# Patient Record
Sex: MALE | Race: WHITE | NOT HISPANIC OR LATINO | Employment: OTHER | URBAN - METROPOLITAN AREA
[De-identification: names, ages, dates, MRNs, and addresses within clinical notes are randomized per-mention and may not be internally consistent; named-entity substitution may affect disease eponyms.]

---

## 2020-03-14 PROBLEM — I63.9 CEREBROVASCULAR ACCIDENT (CVA): Status: ACTIVE | Noted: 2020-03-14

## 2020-03-14 PROBLEM — N17.9 AKI (ACUTE KIDNEY INJURY): Status: ACTIVE | Noted: 2020-03-14

## 2020-03-14 PROBLEM — Z71.89 ACP (ADVANCE CARE PLANNING): Status: ACTIVE | Noted: 2020-03-14

## 2020-03-15 PROBLEM — I63.511 ACUTE ISCHEMIC RIGHT MCA STROKE: Status: ACTIVE | Noted: 2020-03-14

## 2020-03-15 PROBLEM — G21.9 SECONDARY PARKINSONISM: Status: ACTIVE | Noted: 2020-03-15

## 2020-03-15 PROBLEM — R26.81 UNSTEADY GAIT: Status: ACTIVE | Noted: 2020-03-15

## 2020-03-17 PROBLEM — I10 PRIMARY HYPERTENSION: Status: ACTIVE | Noted: 2020-03-17

## 2020-03-17 PROBLEM — E78.5 DYSLIPIDEMIA: Status: ACTIVE | Noted: 2020-03-17

## 2020-03-17 PROBLEM — R53.1 LEFT-SIDED WEAKNESS: Status: ACTIVE | Noted: 2020-03-17

## 2020-03-25 ENCOUNTER — TELEPHONE (OUTPATIENT)
Dept: NEUROLOGY | Facility: CLINIC | Age: 80
End: 2020-03-25

## 2020-03-25 NOTE — TELEPHONE ENCOUNTER
----- Message from Jolanta Vasquez sent at 3/24/2020 10:49 AM CDT -----  Contact: Kelly  Type: Needs Medical Advice    Who Called:  Patient's daughter Kelly  Symptoms (please be specific):    How long has patient had these symptoms:    Pharmacy name and phone #:    Best Call Back Number:   Additional Information: requesting a call back to schedule hospital follow up,patient was seen by  in [facility]

## 2020-03-25 NOTE — TELEPHONE ENCOUNTER
Left message to call in regards to scheduling a hospital f/u.  Will call when the NP schedule is open.

## 2020-03-31 ENCOUNTER — TELEPHONE (OUTPATIENT)
Dept: NEUROLOGY | Facility: CLINIC | Age: 80
End: 2020-03-31

## 2020-04-03 ENCOUNTER — TELEPHONE (OUTPATIENT)
Dept: NEUROLOGY | Facility: CLINIC | Age: 80
End: 2020-04-03

## 2020-04-03 NOTE — TELEPHONE ENCOUNTER
----- Message from Kelli Julio sent at 4/3/2020 10:23 AM CDT -----  Type:  Patient Returning Call    Who Called:  Kelly (daughter)  Who Left Message for Patient:  Nika  Does the patient know what this is regarding?: schedule hospital f/u  Best Call Back Number:  [phone]  Additional Information:

## 2020-04-06 ENCOUNTER — TELEPHONE (OUTPATIENT)
Dept: NEUROLOGY | Facility: CLINIC | Age: 80
End: 2020-04-06

## 2020-04-06 ENCOUNTER — OFFICE VISIT (OUTPATIENT)
Dept: NEUROLOGY | Facility: CLINIC | Age: 80
End: 2020-04-06
Payer: MEDICARE

## 2020-04-06 DIAGNOSIS — E78.5 DYSLIPIDEMIA: ICD-10-CM

## 2020-04-06 DIAGNOSIS — R26.81 UNSTEADY GAIT: ICD-10-CM

## 2020-04-06 DIAGNOSIS — I63.511 ACUTE ISCHEMIC RIGHT MCA STROKE: Primary | ICD-10-CM

## 2020-04-06 PROCEDURE — 99214 OFFICE O/P EST MOD 30 MIN: CPT | Mod: 95,,, | Performed by: NURSE PRACTITIONER

## 2020-04-06 PROCEDURE — 99214 PR OFFICE/OUTPT VISIT, EST, LEVL IV, 30-39 MIN: ICD-10-PCS | Mod: 95,,, | Performed by: NURSE PRACTITIONER

## 2020-04-06 RX ORDER — CLOPIDOGREL BISULFATE 75 MG/1
75 TABLET ORAL DAILY
Qty: 90 TABLET | Refills: 3 | Status: SHIPPED | OUTPATIENT
Start: 2020-04-06 | End: 2020-06-16 | Stop reason: SDUPTHER

## 2020-04-06 NOTE — ASSESSMENT & PLAN NOTE
Continue HH/PT/OT for now  Plan for outpatient therapy when able  Currently on DAPT   - OK to D/C ASA on 4/11/2020 and continue Plavix only  Holter Monitor as per Cards  Continue monitoring BP       Stroke education provided

## 2020-04-06 NOTE — ASSESSMENT & PLAN NOTE
H/o recent falls; none since hospital discharge   Left sided weakness improving as per pt & family report.   Cont HH/PT/OT. oupt PT would benefit pt in the future    Fall precautions discussed with pt and family.

## 2020-04-06 NOTE — PATIENT INSTRUCTIONS
Patient instructed to continue DAPT until 4/11/2020 and then D/C ASA and take Plavix only. New Rx sent to pharmacy.           STROKE EDUCATION  1. I have discussed the patient's stroke risk factors and the importance to modify them in order to reduce the risk of future events.   2. Discussed the importance of a healthy diet and daily exercise in order to reduce the risk of future strokes.  3. Reviewed the usual stroke warning signs and symptoms, and the need to activate EMS (call 911) as soon as the symptoms present.   *Sudden onset numbness or weakness of the face, arm, or leg; especially on one side of he body  *Sudden confusion, trouble speaking, or understanding  *Sudden trouble seeing in one or both eyes  *Sudden trouble walking, dizziness, loss of coordination  *Sudden severe headache with no known cause  4. Discussed target goal range for BP <140/90 diabetic patients <130/80  5. Discussed target goal range for Lipids- Total Cholesterol <200, LDL <100 diabetic patients <70  6. Discussed target goal range for HgA1c <7.0  7. Discussed mportance of evaluation for and control of sleep apnea  8. I have given the patient/caregiver written instructions and information regarding stroke.        FALL PREVENTION   Falls often occur due to slipping, tripping or losing your balance. Here are ways to reduce your risk of falling again.   · Was there anything that caused your fall that can be fixed, removed or replaced?   · Make your home safe by keeping walkways clear of objects you may trip over.   · Use non-slip pads under rugs.   · Do not walk in poorly lit areas.   · Do not stand on chairs or wobbly ladders.   · Use caution when reaching overhead or looking upward. This position can cause a loss of balance.   · Be sure your shoes fit properly, have non-slip bottoms and are in good condition.   · Be cautious when going up and down stairs, curbs, and when walking on uneven sidewalks.   · If your balance is poor, consider  using a cane or walker.   · If your fall was related to alcohol use, stop or limit alcohol intake.   · If your fall was related to use of sleeping medicines, talk to your doctor about this. You may need to reduce your dosage at bedtime if you awaken during the night to go to the bathroom.   · To reduce the need for nighttime bathroom trips:   · Avoid drinking fluids for several hours before going to bed   · Empty your bladder before going to bed   · Men can keep a urinal at the bedside   © 2396-6285 [facility], [address] [place] [address], [place] [address]. All rights reserved. This information is not intended as a substitute for professional medical care. Always follow your healthcare professional's instructions.

## 2020-04-06 NOTE — TELEPHONE ENCOUNTER
----- Message from Alexia Cifuentes sent at 4/6/2020  1:54 PM CDT -----  Contact: Kiki  Patient's daughter called in regards to no one ever contacting her in regards to the VV scheduled for today at 1:00.  Stated she was told someone would call beforehand?    Please call at:[phone]

## 2020-04-06 NOTE — ASSESSMENT & PLAN NOTE
Continue Statin for stroke prevention with LDL goal <70   - pt will need f/u Lipid panel as statin was started in hospital

## 2020-04-06 NOTE — PROGRESS NOTES
NEUROLOGY  Outpatient CONSULT  Telemedicine Visit  [facility]  [address], [place] [address]  [phone] (office) / [phone] (fax)    Patient Name:  Giovanny Chaudhari  :  1940  MR #:  [id number]  Acct #:  [id number]    Date of Neurology Consult: 2020  Name of Provider: BRIGIDO Montoya    Other Physicians:  Primary Doctor No (Primary Care Physician); No ref. provider found (Referring)      Chief Complaint: No chief complaint on file.      History of Present Illness (HPI):  Patient new to me    Giovanny Chaudhari is a 79 y.o. right/left handed male.  Patient presented to ED for dizziness on 3/14/2020.  He had reported intermittent dizziness for the past 2 weeks prior to this. The episode of dizziness the previous day caused him to fall sideways and landed on his left arm.  The 2nd fall caused him to injure his right hand.  Patient did not report hitting his head with any of these falls.  No loss of consciousness no vomiting since these episodes. Per report, the next morning his girlfriend came over and noted his speech was dysarthric, left side facial droop, slow, and is movement appeared uncoordinated.  Neuro testing was completed. MRI showed multiple right MCA territory infarcts in the corona radiata and right frontal cortex. Patient discharged home on 3/16/2020.    On 3/17/2020 Patient presented back to [facility] with worsening left-sided weakness and facial droop. Daughter who is present at time of virtual visit states on morning of  pt took his scheduled BP med and was noted to be normal while at home. Once he arrived to [facility] his BP was reported to be low. Mild residual left-sided weakness and facial droop were reported after recent ischemic right-sided CVA. MRI showed evolving right MCA stroke.  His BP medications were placed on hold while in the hospital and adjusted accordingly as per IM team.         Since discharge patient is getting home health with PT/OT  services 2-3 times per week. He states he has been feeling better overall and feels that his left side has gotten stronger. He denies any more dizziness or falls since discharge. Patient did follow up with Cardiologist and received Holter monitor on March 31, 2020. His daughter has been tracking his BP readings at home and are WNL. Patient does use a walker at times but for the most part is able to ambulate freely.          Treatment to date:    Patient opted against loop recorder  Holter monitor ordered  Home health, PT/OT          Review of Systems:   General:  Fatigue: Yes,   Fever: No  Respiratory:  Cough: No, Shortness of Breath: No,   Eyes:  Blurred Vision: No, Double Vision: No,   Eye pain: No  Musculoskeletal: Muscle Aches/Pain: No, Joint Pain/Swelling: No,   Neurological: Difficulty Walking/Falls: No, Headache Migraine: No, Dizziness/Vertigo: No, Fainting: No, Difficulty with Speech: No, Weakness: Yes, Tingling/Numbness: No, Tremors: No, Seizures: No,   Cardiovascular: Chest Pain: No, Shortness of Breath: No,   Gastrointestinal: Nausea/Vomiting: No,   Psych/Cog:  Depression: No  :  Incontinence: No,   ENT:Hearing Loss: No        Past Medical, Surgical, Family & Social History:   Past Medical History:   Diagnosis Date    Hypertension     Stroke     Ischemic CVA     Past Surgical History:   Procedure Laterality Date    CATARACT EXTRACTION W/  INTRAOCULAR LENS IMPLANT  2010    DENTAL SURGERY      HERNIA REPAIR  1965     No family history on file.  Alcohol use:  reports that he does not drink alcohol.   (Of note, 0.6 oz = 1 beer or 6 oz = 10 beers).  Tobacco use:  reports that he quit smoking about 38 years ago. His smoking use included cigarettes. He does not have any smokeless tobacco history on file.  Street drug use:  has no drug history on file.  Allergies: Iodine and iodide containing products and Penicillins.    Home Medications:     Current Outpatient Medications:     aspirin (ECOTRIN) 81 MG EC  tablet, Take 1 tablet (81 mg total) by mouth once daily., Disp: , Rfl: 0    atorvastatin (LIPITOR) 80 MG tablet, Take 1 tablet (80 mg total) by mouth every evening., Disp: 90 tablet, Rfl: 3    clopidogreL (PLAVIX) 75 mg tablet, Take 1 tablet (75 mg total) by mouth once daily., Disp: 30 tablet, Rfl: 11    lisinopriL 10 MG tablet, Take 1 tablet (10 mg total) by mouth once daily., Disp: 90 tablet, Rfl: 0    Physical Examination:  Virtual Visit      There were no vitals taken for this visit.    GENERAL:  General appearance: Well, non-toxic appearing.  No apparent distress.    MENTAL STATUS:  Alertness, attention span & concentration: normal.  Language: normal.  Orientation to self, place & time:  normal.    SPEECH:  Clear and fluent.  Follows complex commands.    CRANIAL NERVES:  Cranial Nerves II-XII were examined.  II - Visual fields: normal.  III, IV, VI: PERRL, EOMI, No ptosis, No nystagmus.  VII - Face symmetry & mobility: mild left facial droop  VIII - Hearing: normal.  IX, X - Palate: mobile & midline.  XI - Shoulder shrug: normal.  XII - Tongue protrusion: normal.    GROSS MOTOR:  Abnormal movements: none.  Finger-nose normal.      Diagnostic Data Reviewed:   Component      Latest Ref Rng & Units 3/19/2020 3/15/2020 3/14/2020   WBC      3.90 - 12.70 K/uL 7.61     RBC      4.60 - 6.20 M/uL 4.25 (L)     Hemoglobin      14.0 - 18.0 g/dL 13.0 (L)     Hematocrit      40.0 - 54.0 % 37.6 (L)     MCV      82 - 98 fL 89     MCH      27.0 - 31.0 pg 30.6     MCHC      32.0 - 36.0 g/dL 34.6     RDW      11.5 - 14.5 % 11.9     Platelets      150 - 350 K/uL 179     MPV      9.2 - 12.9 fL 9.2     Immature Granulocytes      0.0 - 0.5 % 0.3     Gran # (ANC)      1.8 - 7.7 K/uL 3.8     Immature Grans (Abs)      0.00 - 0.04 K/uL 0.02     Lymph #      1.0 - 4.8 K/uL 2.6     Mono #      0.3 - 1.0 K/uL 0.9     Eos #      0.0 - 0.5 K/uL 0.2     Baso #      0.00 - 0.20 K/uL 0.05     nRBC      0 /100 WBC 0     Gran%      38.0 - 73.0  % 50.4     Lymph%      18.0 - 48.0 % 33.8     Mono%      4.0 - 15.0 % 12.0     Eosinophil%      0.0 - 8.0 % 2.8     Basophil%      0.0 - 1.9 % 0.7     Differential Method       Automated     Sodium      136 - 145 mmol/L 137     Potassium      3.5 - 5.1 mmol/L 3.7     Chloride      95 - 110 mmol/L 105     CO2      22 - 31 mmol/L 25     Glucose      70 - 110 mg/dL 100     BUN, Bld      9 - 21 mg/dL 19     Creatinine      0.50 - 1.40 mg/dL 1.05     Calcium      8.4 - 10.2 mg/dL 8.6     PROTEIN TOTAL      6.0 - 8.4 g/dL 6.6     Albumin      3.5 - 5.2 g/dL 3.6     BILIRUBIN TOTAL      0.2 - 1.3 mg/dL 0.7     Alkaline Phosphatase      38 - 145 U/L 71     AST      17 - 59 U/L 30     ALT      0 - 50 U/L 17     Anion Gap      8 - 16 mmol/L 7 (L)     eGFR if African American      >60 mL/min/1.73 m:2 >60     eGFR if non African American      >60 mL/min/1.73 m:2 >60     Cholesterol      120 - 199 mg/dL  177    Triglycerides      30 - 150 mg/dL  121    HDL      40 - 75 mg/dL  43    LDL Cholesterol External      63.0 - 159.0 mg/dL  109.8    Hdl/Cholesterol Ratio      20.0 - 50.0 %  24.3    Total Cholesterol/HDL Ratio      2.0 - 5.0  4.1    Non-HDL Cholesterol      mg/dL  134    Hemoglobin A1C External      0.0 - 5.6 %   6.3 (H)   Estimated Avg Glucose      68 - 131 mg/dL   134 (H)   TSH      0.400 - 4.000 uIU/mL   2.190       MRI Brain 3/14/2020:   1. Multiple right MCA territory infarcts in the corona radiata and right frontal cortex.  At least some of these are acute.  There may be some early subacute infarcts as well.  2. Background mild chronic microvascular ischemic change.    CUS 3/15/2020:   No evidence of hemodynamically significant carotid bifurcation stenosis is demonstrated based on peak systolic velocities and ratios.    MRA Brain 3/15/2020:   1. There is suggestion of truncation of the right middle cerebral trifurcation with findings suggestive of either a nonocclusive thrombus or dissection at the middle cerebral  trifurcation.  There is only partial opacification of the M2 segments of the middle cerebral arteries.  2. Left cerebral circulation and posterior fossa circulation are otherwise grossly unremarkable.    MRA neck 3/15/2020:   1. Irregular bilateral carotid bulb plaques with less than 50% diameter stenosis.  The ulcerated plaques can not be totally excluded given the irregularity of the carotid bulb plaques.  2. Mild circumferential stenotic disease involving the origin of the left vertebral artery.    MRI Brain 3/18/2020:   1. New/enlarging areas of acute and subacute ischemia within the right corona radiata.  2. Stable small foci of ischemia within the right frontal cortex.      Assessment and Plan:  Giovanny Chaudhari is a 79 y.o.  Male that is following up virtually for right MCA evolving stroke.   He is currently on DAPT and receiving HH/PT/OT x2-3/week. He also has a Holter Monitor via Cardiology. Overall he feels that he is stronger.        Problem List Items Addressed This Visit        Neuro    Acute ischemic right MCA stroke - Primary    Overview     MRI brain 3/14/2020 : . Multiple right MCA territory infarcts in the corona radiata and right frontal cortex.  At least some of these are acute.  There may be some early subacute infarcts as well.  2. Background mild chronic microvascular ischemic change.    MRI brain 3/18/2020:   1. New/enlarging areas of acute and subacute ischemia within the right corona radiata.  2. Stable small foci of ischemia within the right frontal cortex.         Current Assessment & Plan     Continue HH/PT/OT for now  Plan for outpatient therapy when able  Currently on DAPT   - OK to D/C ASA on 4/11/2020 and continue Plavix only  Holter Monitor as per Cards  Continue monitoring BP       Stroke education provided            Cardiac/Vascular    Dyslipidemia    Current Assessment & Plan     Continue Statin for stroke prevention with LDL goal <70   - pt will need f/u Lipid panel as statin was  started in hospital             Other    Unsteady gait    Current Assessment & Plan     H/o recent falls; none since hospital discharge   Left sided weakness improving as per pt & family report.   Cont HH/PT/OT. oupt PT would benefit pt in the future    Fall precautions discussed with pt and family.                The patient will return to clinic in 2-3 months.    Important to note, also  has a past medical history of Hypertension and Stroke.      The patient location is: [place], [place]  The chief complaint leading to consultation is: CVA, hospital f/u  Visit type: Virtual visit with synchronous audio and video  Total time spent with patient: 30 mins  Each patient to whom he or she provides medical services by telemedicine is:  (1) informed of the relationship between the physician and patient and the respective role of any other health care provider with respect to management of the patient; and (2) notified that he or she may decline to receive medical services by telemedicine and may withdraw from such care at any time.                  BRIGIDO Montoya    This note was created with voice recognition software.  Grammatical, syntax and spelling errors may be inevitable.

## 2020-04-14 ENCOUNTER — TELEPHONE (OUTPATIENT)
Dept: NEUROLOGY | Facility: CLINIC | Age: 80
End: 2020-04-14

## 2020-04-14 NOTE — TELEPHONE ENCOUNTER
----- Message from Tanisha Perez sent at 4/14/2020  9:37 AM CDT -----  Contact: Meryl/Physical Therapist [facility]  Type: Needs Medical Advice  Who Called:  Meryl  Best Call Back Number: [phone]  Additional Information: Meryl would like to speak with Radha ALDANA About this patient can Radha order outpatient physical therapy for this patient.  Please call to advise.  Thanks!

## 2020-04-24 DIAGNOSIS — I63.9 CEREBROVASCULAR ACCIDENT (CVA), UNSPECIFIED MECHANISM: Primary | ICD-10-CM

## 2020-04-27 ENCOUNTER — TELEPHONE (OUTPATIENT)
Dept: NEUROLOGY | Facility: CLINIC | Age: 80
End: 2020-04-27

## 2020-04-27 NOTE — TELEPHONE ENCOUNTER
Pt's daughter notified that the OT order was put in the system and that she needs to get  a PCP.  She verbalized understanding.

## 2020-04-28 PROBLEM — R20.0 NUMBNESS AND TINGLING IN LEFT HAND: Status: ACTIVE | Noted: 2020-04-28

## 2020-04-28 PROBLEM — R20.2 NUMBNESS AND TINGLING IN LEFT HAND: Status: ACTIVE | Noted: 2020-04-28

## 2020-05-13 ENCOUNTER — OFFICE VISIT (OUTPATIENT)
Dept: FAMILY MEDICINE | Facility: CLINIC | Age: 80
End: 2020-05-13
Payer: MEDICARE

## 2020-05-13 DIAGNOSIS — E78.49 OTHER HYPERLIPIDEMIA: ICD-10-CM

## 2020-05-13 DIAGNOSIS — I10 ESSENTIAL HYPERTENSION: ICD-10-CM

## 2020-05-13 DIAGNOSIS — R53.81 DEBILITY: ICD-10-CM

## 2020-05-13 DIAGNOSIS — I69.30 HISTORY OF ISCHEMIC CEREBROVASCULAR ACCIDENT (CVA) WITH RESIDUAL DEFICIT: ICD-10-CM

## 2020-05-13 PROCEDURE — 99203 OFFICE O/P NEW LOW 30 MIN: CPT | Mod: 95,,, | Performed by: INTERNAL MEDICINE

## 2020-05-13 PROCEDURE — 99203 PR OFFICE/OUTPT VISIT, NEW, LEVL III, 30-44 MIN: ICD-10-PCS | Mod: 95,,, | Performed by: INTERNAL MEDICINE

## 2020-05-13 PROCEDURE — G0463 HOSPITAL OUTPT CLINIC VISIT: HCPCS | Mod: PO

## 2020-05-13 PROCEDURE — 99211 OFF/OP EST MAY X REQ PHY/QHP: CPT | Mod: PO

## 2020-05-13 NOTE — PROGRESS NOTES
Patient ID: Giovanny Chaudhari     Chief Complaint: Establish care after CVA     The patient location is: [place]   The chief complaint leading to consultation is: Establish care after CVA   Visit type: audiovisual  Total time spent with patient: 20 minutes     Each patient to whom he or she provides medical services by telemedicine is:  (1) informed of the relationship between the physician and patient and the respective role of any other health care provider with respect to management of the patient; and (2) notified that he or she may decline to receive medical services by telemedicine and may withdraw from such care at any time.    HPI:  New patient to me that is here to establish care after his right ischemic CVA with residual Left side deficits.  Since discharge from [facility] in late March, he has been doing very well with [address] [place] [address] health physical therapy/occupational therapy and now outpatient PT/OT.  He has regained quite a bit of strength and function on the left side and now only exhibits mild left-sided facial droop, mild decrease in left upper extremity strength and coordination, moderate decrease in strength is left lower extremity.  Specifically he walks either with a cane or unassisted though he does have some footdrop when he gets tired according to the physical therapy notes.  He wants to return to his premorbid condition and be able to live alone and Dr. and I think he is on his way to doing that.  He is tolerating his new medicines and his blood pressure is very well controlled but I do want to recheck his liver enzymes as he is on a generous dose of Lipitor.  He does have an appointment with  on May 19th 2 check his Holter monitor for any arrhythmias.  Hospital notes indicate that he initially presented with a right middle cerebral artery CVA and was discharged home but then re-presented with further left-sided weakness that Dr. Kelley believes was due to hypotension  with an extension of his previously known stroke.    Review of Systems   Constitutional: Positive for activity change. Negative for unexpected weight change.   HENT: Positive for rhinorrhea. Negative for hearing loss and trouble swallowing.    Eyes: Negative.  Negative for discharge and visual disturbance.   Respiratory: Negative.  Negative for chest tightness and wheezing.    Cardiovascular: Negative.  Negative for chest pain and palpitations.   Gastrointestinal: Negative.  Negative for blood in stool, constipation, diarrhea and vomiting.   Endocrine: Negative.  Negative for polydipsia and polyuria.   Genitourinary: Negative.  Negative for difficulty urinating, hematuria and urgency.   Musculoskeletal: Negative.  Negative for arthralgias, joint swelling and neck pain.   Skin: Negative.    Allergic/Immunologic: Negative.    Neurological: Negative.  Negative for weakness and headaches.   Hematological: Negative.    Psychiatric/Behavioral: Negative.  Negative for confusion and dysphoric mood.          Objective:      Physical Exam   Physical Exam   Constitutional: He is oriented to person, place, and time. He appears well-developed and well-nourished.   HENT:   Head: Normocephalic and atraumatic.   Eyes: Conjunctivae and EOM are normal.   Neck: Normal range of motion.   Pulmonary/Chest: Effort normal.   Musculoskeletal:   Left-sided facial droop.  Decreased range of motion with left hand .   Neurological: He is alert and oriented to person, place, and time.   Skin: Skin is dry.   Psychiatric: He has a normal mood and affect. His behavior is normal. Judgment and thought content normal.       Current Outpatient Medications:     atorvastatin (LIPITOR) 80 MG tablet, Take 1 tablet (80 mg total) by mouth every evening., Disp: 90 tablet, Rfl: 3    clopidogreL (PLAVIX) 75 mg tablet, Take 1 tablet (75 mg total) by mouth once daily., Disp: 90 tablet, Rfl: 3    lisinopriL 10 MG tablet, Take 1 tablet (10 mg total) by mouth  once daily., Disp: 90 tablet, Rfl: 0          Vitals: There were no vitals filed for this visit.   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    Diagnoses and all orders for this visit:    Diagnoses and all orders for this visit:    Other hyperlipidemia  -     Comprehensive metabolic panel; Future    Essential hypertension  Controlled per home Blood Pressure machine    History of ischemic cerebrovascular accident (CVA) with residual deficit  Continue Plavix     Debility  Improving w/ Physical Therapy/ Occupational Therapy

## 2020-06-08 ENCOUNTER — TELEPHONE (OUTPATIENT)
Dept: FAMILY MEDICINE | Facility: CLINIC | Age: 80
End: 2020-06-08

## 2020-06-08 NOTE — TELEPHONE ENCOUNTER
----- Message from Thuy Bolivar sent at 6/8/2020  9:48 AM CDT -----  Contact: Daughter Katie  Type:  Patient Returning Call    Who Called:  Katie  Who Left Message for Patient:  No idea  Does the patient know what this is regarding?:  Either results or for his upcoming appt on Wed  Best Call Back Number:  [phone]  Additional Information:  na

## 2020-06-10 ENCOUNTER — OFFICE VISIT (OUTPATIENT)
Dept: FAMILY MEDICINE | Facility: CLINIC | Age: 80
End: 2020-06-10
Payer: MEDICARE

## 2020-06-10 VITALS
HEIGHT: 68 IN | DIASTOLIC BLOOD PRESSURE: 60 MMHG | OXYGEN SATURATION: 97 % | BODY MASS INDEX: 24.56 KG/M2 | WEIGHT: 162.06 LBS | TEMPERATURE: 98 F | SYSTOLIC BLOOD PRESSURE: 118 MMHG | HEART RATE: 52 BPM

## 2020-06-10 DIAGNOSIS — E78.49 OTHER HYPERLIPIDEMIA: ICD-10-CM

## 2020-06-10 DIAGNOSIS — R73.01 IMPAIRED FASTING GLUCOSE: Primary | ICD-10-CM

## 2020-06-10 DIAGNOSIS — R53.81 DEBILITY: ICD-10-CM

## 2020-06-10 DIAGNOSIS — I10 ESSENTIAL HYPERTENSION: ICD-10-CM

## 2020-06-10 DIAGNOSIS — D64.9 ANEMIA, UNSPECIFIED TYPE: ICD-10-CM

## 2020-06-10 DIAGNOSIS — I69.30 HISTORY OF ISCHEMIC CEREBROVASCULAR ACCIDENT (CVA) WITH RESIDUAL DEFICIT: ICD-10-CM

## 2020-06-10 DIAGNOSIS — G47.09 OTHER INSOMNIA: ICD-10-CM

## 2020-06-10 PROCEDURE — G0009 ADMIN PNEUMOCOCCAL VACCINE: HCPCS | Mod: PBBFAC,PO

## 2020-06-10 PROCEDURE — 99214 PR OFFICE/OUTPT VISIT, EST, LEVL IV, 30-39 MIN: ICD-10-PCS | Mod: S$PBB,,, | Performed by: INTERNAL MEDICINE

## 2020-06-10 PROCEDURE — 99214 OFFICE O/P EST MOD 30 MIN: CPT | Mod: S$PBB,,, | Performed by: INTERNAL MEDICINE

## 2020-06-10 PROCEDURE — 99214 OFFICE O/P EST MOD 30 MIN: CPT | Mod: PBBFAC,PO,25 | Performed by: INTERNAL MEDICINE

## 2020-06-10 PROCEDURE — 99999 PR PBB SHADOW E&M-EST. PATIENT-LVL IV: CPT | Mod: PBBFAC,,, | Performed by: INTERNAL MEDICINE

## 2020-06-10 PROCEDURE — 99999 PR PBB SHADOW E&M-EST. PATIENT-LVL IV: ICD-10-PCS | Mod: PBBFAC,,, | Performed by: INTERNAL MEDICINE

## 2020-06-10 RX ORDER — CHOLECALCIFEROL (VITAMIN D3) 25 MCG
1000 TABLET ORAL DAILY
COMMUNITY

## 2020-06-10 NOTE — PROGRESS NOTES
Patient ID: Giovanny Chaudhari     Chief Complaint:   Chief Complaint   Patient presents with    Follow-up        HPI: Follow up after our first video office visit. Over the last month, he has improved and has graduated to walking unassisted. Blood Pressure Controlled , but will Monitor for overmedication. Complains of insomnia but declines med. Occas gets Left Lower Extremity sleep related leg cramps. Labs showed a normal Liver Function Tests normal but Alk Phos elevated - started Vit D and glucose elevated. A1C in March = 6.3- will Monitor.     Review of Systems   Constitutional: Negative.    HENT: Negative.    Eyes: Negative.    Respiratory: Negative.    Cardiovascular: Negative.    Gastrointestinal: Negative.    Endocrine: Negative.    Genitourinary: Negative.    Musculoskeletal: Negative.    Skin: Negative.    Allergic/Immunologic: Negative.    Neurological: Negative.    Hematological: Negative.    Psychiatric/Behavioral: Negative.           Objective:      Physical Exam   Physical Exam   Constitutional: He is oriented to person, place, and time. He appears well-developed and well-nourished.   HENT:   Head: Normocephalic and atraumatic.   Nose: Nose normal.   Mouth/Throat: Oropharynx is clear and moist.   Eyes: Pupils are equal, round, and reactive to light. Conjunctivae and EOM are normal.   Neck: Normal range of motion. Neck supple.   Cardiovascular: Normal rate, regular rhythm, normal heart sounds and intact distal pulses.   Pulmonary/Chest: Effort normal and breath sounds normal.   Abdominal: Soft. Bowel sounds are normal.   Musculoskeletal: Normal range of motion.   Neurological: He is alert and oriented to person, place, and time.   Skin: Skin is warm and dry. Capillary refill takes less than 2 seconds.   Psychiatric: He has a normal mood and affect. His behavior is normal. Judgment and thought content normal.   Nursing note and vitals reviewed.      Current Outpatient Medications:     atorvastatin (LIPITOR)  "80 MG tablet, Take 1 tablet (80 mg total) by mouth every evening., Disp: 90 tablet, Rfl: 3    clopidogreL (PLAVIX) 75 mg tablet, Take 1 tablet (75 mg total) by mouth once daily., Disp: 90 tablet, Rfl: 3    lisinopriL 10 MG tablet, Take 1 tablet (10 mg total) by mouth once daily., Disp: 90 tablet, Rfl: 0    vitamin D (VITAMIN D3) 1000 units Tab, Take 1,000 Units by mouth once daily., Disp: , Rfl:          Vitals:   Vitals:    06/10/20 1302   BP: 118/60   BP Location: Right arm   Patient Position: Sitting   Pulse: (!) 52   Temp: 97.5 °F (36.4 °C)   TempSrc: Oral   SpO2: 97%   Weight: 73.5 kg (162 lb 0.6 oz)   Height: 5' 8" (1.727 m)      Assessment:       Patient Active Problem List    Diagnosis Date Noted    Impaired fasting glucose 06/10/2020    Other insomnia 06/10/2020    Other hyperlipidemia     Hypertension     History of ischemic cerebrovascular accident (CVA) with residual deficit     Debility     Numbness and tingling in left hand 04/28/2020    Left-sided weakness 03/17/2020    Essential hypertension 03/17/2020    Dyslipidemia 03/17/2020    Unsteady gait 03/15/2020    Acute ischemic right MCA stroke 03/14/2020    ACP (advance care planning) 03/14/2020          Plan:       Giovanny Chaudhari  was seen today for follow-up and may need lab work.    Diagnoses and all orders for this visit:    Giovanny was seen today for follow-up.    Diagnoses and all orders for this visit:    Impaired fasting glucose  -     Hemoglobin A1C; Future    Essential hypertension  Controlled - Monitor for overmedication     Other hyperlipidemia  -     Lipid Panel; Future    History of ischemic cerebrovascular accident (CVA) with residual deficit  Completed Physical Therapy and Occupational Therapy     Debility  Improving     Other insomnia  Declines med for now     Anemia, unspecified type  -     CBC auto differential; Future  -     Ferritin; Future  -     Iron and TIBC; Future    Other orders  -     (In Office Administered) " Pneumococcal Polysaccharide Vaccine (23 Valent) (SQ/IM)

## 2020-06-16 DIAGNOSIS — E78.49 OTHER HYPERLIPIDEMIA: Primary | ICD-10-CM

## 2020-06-16 DIAGNOSIS — I63.511 ACUTE ISCHEMIC RIGHT MCA STROKE: ICD-10-CM

## 2020-06-16 RX ORDER — ATORVASTATIN CALCIUM 80 MG/1
80 TABLET, FILM COATED ORAL NIGHTLY
Qty: 90 TABLET | Refills: 0 | Status: SHIPPED | OUTPATIENT
Start: 2020-06-16 | End: 2020-09-10 | Stop reason: SDUPTHER

## 2020-06-16 RX ORDER — CLOPIDOGREL BISULFATE 75 MG/1
75 TABLET ORAL DAILY
Qty: 90 TABLET | Refills: 0 | Status: SHIPPED | OUTPATIENT
Start: 2020-06-16 | End: 2020-09-11 | Stop reason: SDUPTHER

## 2020-06-16 NOTE — TELEPHONE ENCOUNTER
----- Message from Thuy Bolivar sent at 6/16/2020 10:32 AM CDT -----  Contact: Katie Daughter  Please get these sent over [facility] said they do not have any refills for any of these.  2nd request the doctor said last week when in his office that he would send and didnt  Type:  RX Refill Request    Who Called:  Katie   Refill or New Rx: New RX  RX Name and Strength:  atorvastatin (LIPITOR) 80 MG tablet, clopidogreL (PLAVIX) 75 mg tablet, and lisinopriL 10 MG tablet  How is the patient currently taking it? (ex. 1XDay):  1XDay  Is this a 30 day or 90 day RX:  90  Preferred Pharmacy with phone number:    [facility][id number] - [facility] [id number] [place] [address] [address] [facility] [address] [facility] [facility]  [address] [place] [id number]  Phone: [phone] Fax: [phone]  Local or Mail Order:  local  Ordering Provider:  Now Dr Puja Hoffman Call Back Number:  [phone] (home)   Additional Information:  na

## 2020-06-19 RX ORDER — LISINOPRIL 10 MG/1
10 TABLET ORAL DAILY
Qty: 90 TABLET | Refills: 0 | Status: SHIPPED | OUTPATIENT
Start: 2020-06-19 | End: 2020-09-10 | Stop reason: SDUPTHER

## 2020-06-19 NOTE — TELEPHONE ENCOUNTER
----- Message from David Garcia sent at 6/19/2020  3:12 PM CDT -----  Contact: pt's daughter Katie  Type: Needs Medical Advice    Who Called:  Katie Hoffman Call Back Number: [phone]  Additional Information: Would like to speak to a nurse in the office regarding pt's medication  lisinopriL 10 MG. Informed daughter that nurse Ratna stated it is waiting to be signed off by a doctor. She would still like to speak to someone. Please call to advise.       [facility] #[id number] - [facility] [id number] [place] [address] AT [facility] [address] [facility] [facility]  [address] [place] [id number]  Phone: [phone] Fax: [phone]

## 2020-06-19 NOTE — TELEPHONE ENCOUNTER
----- Message from Soraida Salamanca sent at 6/19/2020  9:17 AM CDT -----  Type:  RX Refill Request    Who Called:  Katie - daughter  Refill or New Rx:  refill  RX Name and Strength:  lisinopriL 10 MG tablet  How is the patient currently taking it? (ex. 1XDay):  1 x day  Is this a 30 day or 90 day RX:  90  Preferred Pharmacy with phone number:    [facility] #[id number] - [facility] [address] [address] [address] [facility] [address]  [address] [place] [address]  Phone: [phone] Fax: [phone]  Local or Mail Order: local  Ordering Provider:    Dalton Call Back Number:  [phone]  Additional Information:  caller requested a refill on this medication on 06/16/2020, this is the only medication that was not sent , patient is now out medication. Please contact when med is sent to the pharmacy.

## 2020-06-19 NOTE — PROGRESS NOTES
Refill Routing Note    Medication(s) are not appropriate for processing by [facility]:       Medication not previously prescribed by PCP        Medication Therapy Plan: medication last prescribed by Jamil(3/20); please advise; defer   Medication reconciliation completed: No      Automatic Epic Protocol Generated Data:    Requested Prescriptions   Pending Prescriptions Disp Refills    lisinopriL 10 MG tablet 90 tablet 0     Sig: Take 1 tablet (10 mg total) by mouth once daily.       Cardiovascular:  ACE Inhibitors Passed - 6/19/2020 11:07 AM        Passed - Patient is at least 18 years old        Passed - Last BP in normal range within 360 days.     BP Readings from Last 3 Encounters:   06/10/20 118/60   04/15/20 130/70   04/07/20 122/68              Passed - Office visit in past 12 months or future 90 days.     Recent Outpatient Visits            1 week ago Impaired fasting glucose    [facility] Rodrigue Luo MD    1 month ago Ventricular tachycardia seen on cardiac monitor    [facility] Shahram Baldwin MD    1 month ago Other hyperlipidemia    [facility] Rodrigue Luo MD    2 months ago Acute ischemic right MCA stroke    [facility] JOE Mattson    2 months ago Acute ischemic right MCA stroke    [facility] Jeffrey Lisa MD          Future Appointments              In 3 months LAB, [place] [facility]    In 3 months Rodrigue Luo MD [facility]                Passed - Cr is 1.3 or below and within 360 days     Creatinine   Date Value Ref Range Status   06/04/2020 1.24 0.50 - 1.40 mg/dL Final   03/20/2020 1.11 0.50 - 1.40 mg/dL Final   03/19/2020 1.05 0.50 - 1.40 mg/dL Final              Passed - K in normal range and within 360 days     Potassium   Date Value Ref Range Status   06/04/2020 4.8 3.5 - 5.1 mmol/L Final   03/20/2020 3.8  3.5 - 5.1 mmol/L Final   03/19/2020 3.7 3.5 - 5.1 mmol/L Final              Passed - eGFR within 360 days     eGFR if non    Date Value Ref Range Status   06/04/2020 54 (A) >60 mL/min/1.73 m^2 Final     Comment:     Calculation used to obtain the estimated glomerular filtration  rate (eGFR) is the CKD-EPI equation.      03/20/2020 >60 >60 mL/min/1.73 m^2 Final     Comment:     Calculation used to obtain the estimated glomerular filtration  rate (eGFR) is the CKD-EPI equation.      03/19/2020 >60 >60 mL/min/1.73 m^2 Final     Comment:     Calculation used to obtain the estimated glomerular filtration  rate (eGFR) is the CKD-EPI equation.        eGFR if    Date Value Ref Range Status   06/04/2020 >60 >60 mL/min/1.73 m^2 Final   03/20/2020 >60 >60 mL/min/1.73 m^2 Final   03/19/2020 >60 >60 mL/min/1.73 m^2 Final                    Appointments  past 12m or future 3m with PCP    Date Provider   Last Visit   6/10/2020 Rodrigue Luo MD   Next Visit   10/16/2020 Rodrigue Luo MD   ED visits in past 90 days: 0     Note composed:11:10 AM 06/19/2020

## 2020-09-10 DIAGNOSIS — I10 ESSENTIAL HYPERTENSION: Primary | ICD-10-CM

## 2020-09-10 DIAGNOSIS — E78.49 OTHER HYPERLIPIDEMIA: ICD-10-CM

## 2020-09-11 ENCOUNTER — PATIENT MESSAGE (OUTPATIENT)
Dept: FAMILY MEDICINE | Facility: CLINIC | Age: 80
End: 2020-09-11

## 2020-09-11 DIAGNOSIS — I63.511 ACUTE ISCHEMIC RIGHT MCA STROKE: ICD-10-CM

## 2020-09-11 RX ORDER — CLOPIDOGREL BISULFATE 75 MG/1
75 TABLET ORAL DAILY
Qty: 90 TABLET | Refills: 3 | Status: SHIPPED | OUTPATIENT
Start: 2020-09-11 | End: 2021-10-05

## 2020-09-11 RX ORDER — LISINOPRIL 10 MG/1
10 TABLET ORAL DAILY
Qty: 90 TABLET | Refills: 3 | Status: SHIPPED | OUTPATIENT
Start: 2020-09-11 | End: 2020-11-16 | Stop reason: SINTOL

## 2020-09-11 RX ORDER — ATORVASTATIN CALCIUM 80 MG/1
80 TABLET, FILM COATED ORAL NIGHTLY
Qty: 90 TABLET | Refills: 3 | Status: SHIPPED | OUTPATIENT
Start: 2020-09-11 | End: 2020-10-16 | Stop reason: SDUPTHER

## 2020-10-01 ENCOUNTER — PATIENT MESSAGE (OUTPATIENT)
Dept: OTHER | Facility: OTHER | Age: 80
End: 2020-10-01

## 2020-10-13 ENCOUNTER — LAB VISIT (OUTPATIENT)
Dept: LAB | Facility: HOSPITAL | Age: 80
End: 2020-10-13
Attending: INTERNAL MEDICINE
Payer: MEDICARE

## 2020-10-13 DIAGNOSIS — R73.01 IMPAIRED FASTING GLUCOSE: ICD-10-CM

## 2020-10-13 DIAGNOSIS — D64.9 ANEMIA, UNSPECIFIED TYPE: ICD-10-CM

## 2020-10-13 DIAGNOSIS — E78.49 OTHER HYPERLIPIDEMIA: ICD-10-CM

## 2020-10-13 LAB
BASOPHILS # BLD AUTO: 0.03 K/UL (ref 0–0.2)
BASOPHILS NFR BLD: 0.4 % (ref 0–1.9)
CHOLEST SERPL-MCNC: 125 MG/DL (ref 120–199)
CHOLEST/HDLC SERPL: 2.5 {RATIO} (ref 2–5)
DIFFERENTIAL METHOD: ABNORMAL
EOSINOPHIL # BLD AUTO: 0.2 K/UL (ref 0–0.5)
EOSINOPHIL NFR BLD: 1.9 % (ref 0–8)
ERYTHROCYTE [DISTWIDTH] IN BLOOD BY AUTOMATED COUNT: 12.5 % (ref 11.5–14.5)
ESTIMATED AVG GLUCOSE: 131 MG/DL (ref 68–131)
FERRITIN SERPL-MCNC: 146 NG/ML (ref 20–300)
HBA1C MFR BLD HPLC: 6.2 % (ref 4–5.6)
HCT VFR BLD AUTO: 42.2 % (ref 40–54)
HDLC SERPL-MCNC: 51 MG/DL (ref 40–75)
HDLC SERPL: 40.8 % (ref 20–50)
HGB BLD-MCNC: 13.3 G/DL (ref 14–18)
IMM GRANULOCYTES # BLD AUTO: 0.01 K/UL (ref 0–0.04)
IMM GRANULOCYTES NFR BLD AUTO: 0.1 % (ref 0–0.5)
IRON SERPL-MCNC: 85 UG/DL (ref 45–160)
LDLC SERPL CALC-MCNC: 53.2 MG/DL (ref 63–159)
LYMPHOCYTES # BLD AUTO: 4.4 K/UL (ref 1–4.8)
LYMPHOCYTES NFR BLD: 53.6 % (ref 18–48)
MCH RBC QN AUTO: 31.1 PG (ref 27–31)
MCHC RBC AUTO-ENTMCNC: 31.5 G/DL (ref 32–36)
MCV RBC AUTO: 99 FL (ref 82–98)
MONOCYTES # BLD AUTO: 0.9 K/UL (ref 0.3–1)
MONOCYTES NFR BLD: 11 % (ref 4–15)
NEUTROPHILS # BLD AUTO: 2.7 K/UL (ref 1.8–7.7)
NEUTROPHILS NFR BLD: 33 % (ref 38–73)
NONHDLC SERPL-MCNC: 74 MG/DL
NRBC BLD-RTO: 0 /100 WBC
PLATELET # BLD AUTO: 195 K/UL (ref 150–350)
PMV BLD AUTO: 9.9 FL (ref 9.2–12.9)
RBC # BLD AUTO: 4.27 M/UL (ref 4.6–6.2)
SATURATED IRON: 23 % (ref 20–50)
TOTAL IRON BINDING CAPACITY: 363 UG/DL (ref 250–450)
TRANSFERRIN SERPL-MCNC: 245 MG/DL (ref 200–375)
TRIGL SERPL-MCNC: 104 MG/DL (ref 30–150)
WBC # BLD AUTO: 8.27 K/UL (ref 3.9–12.7)

## 2020-10-13 PROCEDURE — 80061 LIPID PANEL: CPT

## 2020-10-13 PROCEDURE — 85025 COMPLETE CBC W/AUTO DIFF WBC: CPT

## 2020-10-13 PROCEDURE — 36415 COLL VENOUS BLD VENIPUNCTURE: CPT | Mod: PO

## 2020-10-13 PROCEDURE — 83540 ASSAY OF IRON: CPT

## 2020-10-13 PROCEDURE — 83036 HEMOGLOBIN GLYCOSYLATED A1C: CPT

## 2020-10-13 PROCEDURE — 82728 ASSAY OF FERRITIN: CPT

## 2020-10-16 ENCOUNTER — OFFICE VISIT (OUTPATIENT)
Dept: FAMILY MEDICINE | Facility: CLINIC | Age: 80
End: 2020-10-16
Payer: MEDICARE

## 2020-10-16 VITALS
DIASTOLIC BLOOD PRESSURE: 66 MMHG | TEMPERATURE: 98 F | WEIGHT: 158.06 LBS | BODY MASS INDEX: 23.95 KG/M2 | OXYGEN SATURATION: 98 % | HEIGHT: 68 IN | HEART RATE: 77 BPM | SYSTOLIC BLOOD PRESSURE: 122 MMHG

## 2020-10-16 DIAGNOSIS — R53.81 DEBILITY: ICD-10-CM

## 2020-10-16 DIAGNOSIS — M72.2 PLANTAR FASCIITIS: ICD-10-CM

## 2020-10-16 DIAGNOSIS — R26.2 DIFFICULTY WALKING: ICD-10-CM

## 2020-10-16 DIAGNOSIS — E78.49 OTHER HYPERLIPIDEMIA: ICD-10-CM

## 2020-10-16 DIAGNOSIS — I10 ESSENTIAL HYPERTENSION: Primary | ICD-10-CM

## 2020-10-16 DIAGNOSIS — I69.30 HISTORY OF ISCHEMIC CEREBROVASCULAR ACCIDENT (CVA) WITH RESIDUAL DEFICIT: ICD-10-CM

## 2020-10-16 DIAGNOSIS — K59.03 DRUG-INDUCED CONSTIPATION: ICD-10-CM

## 2020-10-16 DIAGNOSIS — D64.9 ANEMIA, UNSPECIFIED TYPE: ICD-10-CM

## 2020-10-16 PROCEDURE — 99999 PR PBB SHADOW E&M-EST. PATIENT-LVL IV: CPT | Mod: PBBFAC,,, | Performed by: INTERNAL MEDICINE

## 2020-10-16 PROCEDURE — 99214 OFFICE O/P EST MOD 30 MIN: CPT | Mod: S$PBB,,, | Performed by: INTERNAL MEDICINE

## 2020-10-16 PROCEDURE — G0009 ADMIN PNEUMOCOCCAL VACCINE: HCPCS | Mod: PBBFAC,PO

## 2020-10-16 PROCEDURE — 99214 PR OFFICE/OUTPT VISIT, EST, LEVL IV, 30-39 MIN: ICD-10-PCS | Mod: S$PBB,,, | Performed by: INTERNAL MEDICINE

## 2020-10-16 PROCEDURE — 99214 OFFICE O/P EST MOD 30 MIN: CPT | Mod: PBBFAC,PO | Performed by: INTERNAL MEDICINE

## 2020-10-16 PROCEDURE — 99999 PR PBB SHADOW E&M-EST. PATIENT-LVL IV: ICD-10-PCS | Mod: PBBFAC,,, | Performed by: INTERNAL MEDICINE

## 2020-10-16 RX ORDER — ATORVASTATIN CALCIUM 40 MG/1
40 TABLET, FILM COATED ORAL NIGHTLY
Qty: 90 TABLET | Refills: 3 | Status: SHIPPED | OUTPATIENT
Start: 2020-10-16 | End: 2021-10-05

## 2020-10-16 NOTE — PROGRESS NOTES
Patient ID: Giovanny Chaudhari     Chief Complaint:   Chief Complaint   Patient presents with    4 month follow up        HPI: Follow up for anemia that is mild and iron levels are good. He does Complain of alternating constipation and diarrhea that I think is due to his iron supplement. We will stop it for now as I think his 4 lb weight loss due to decreased appetite is due to constipation. Lipids also look overmedicated- cut Atorvastatin in 1/2 to 40 mg. No diabetes mellitus yet- will monitor. He also Complains of Left foot pain in the arch that does improve during the day, so likely plantar fasciitis. A1C stable at 6.2. We discussed and agreed to not add meds if we can help it.     Review of Systems   Constitutional: Negative.    HENT: Negative.    Eyes: Negative.    Respiratory: Negative.    Cardiovascular: Negative.    Gastrointestinal: Positive for constipation.   Endocrine: Negative.    Genitourinary: Negative.    Musculoskeletal: Negative.    Skin: Negative.    Allergic/Immunologic: Negative.    Neurological: Negative.    Hematological: Negative.    Psychiatric/Behavioral: Negative.           Objective:      Physical Exam   Physical Exam  Vitals signs and nursing note reviewed.   Constitutional:       Appearance: Normal appearance. He is well-developed.   HENT:      Head: Normocephalic and atraumatic.      Nose: Nose normal.   Eyes:      Extraocular Movements: Extraocular movements intact.      Conjunctiva/sclera: Conjunctivae normal.      Pupils: Pupils are equal, round, and reactive to light.   Neck:      Musculoskeletal: Normal range of motion and neck supple.   Cardiovascular:      Rate and Rhythm: Normal rate and regular rhythm.      Pulses: Normal pulses.      Heart sounds: Normal heart sounds.   Pulmonary:      Effort: Pulmonary effort is normal.      Breath sounds: Normal breath sounds.   Abdominal:      General: Bowel sounds are normal.      Palpations: Abdomen is soft.   Musculoskeletal: Normal range of  "motion.   Skin:     General: Skin is warm and dry.      Capillary Refill: Capillary refill takes less than 2 seconds.   Neurological:      General: No focal deficit present.      Mental Status: He is alert and oriented to person, place, and time.   Psychiatric:         Mood and Affect: Mood normal.         Behavior: Behavior normal.         Thought Content: Thought content normal.         Judgment: Judgment normal.            Vitals:   Vitals:    10/16/20 0934   BP: 122/66   Pulse: 77   Temp: 97.8 °F (36.6 °C)   TempSrc: Oral   SpO2: 98%   Weight: 71.7 kg (158 lb 1.1 oz)   Height: 5' 8" (1.727 m)          Current Outpatient Medications:     atorvastatin (LIPITOR) 40 MG tablet, Take 1 tablet (40 mg total) by mouth every evening., Disp: 90 tablet, Rfl: 3    clopidogreL (PLAVIX) 75 mg tablet, Take 1 tablet (75 mg total) by mouth once daily., Disp: 90 tablet, Rfl: 3    iron,carb/vit C/vit B12/folic (IRON 100 PLUS ORAL), Take by mouth., Disp: , Rfl:     lisinopriL 10 MG tablet, Take 1 tablet (10 mg total) by mouth once daily., Disp: 90 tablet, Rfl: 3    vitamin D (VITAMIN D3) 1000 units Tab, Take 1,000 Units by mouth once daily., Disp: , Rfl:    Assessment:       Patient Active Problem List    Diagnosis Date Noted    Difficulty walking 10/16/2020    Drug-induced constipation 10/16/2020    Plantar fasciitis 10/16/2020    Anemia 10/16/2020    Impaired fasting glucose 06/10/2020    Other insomnia 06/10/2020    Other hyperlipidemia     Hypertension     History of ischemic cerebrovascular accident (CVA) with residual deficit     Debility     Numbness and tingling in left hand 04/28/2020    Left-sided weakness 03/17/2020    Essential hypertension 03/17/2020    Dyslipidemia 03/17/2020    Unsteady gait 03/15/2020    Acute ischemic right MCA stroke 03/14/2020    ACP (advance care planning) 03/14/2020          Plan:       Giovanny Chaudhari  was seen today for follow-up and may need lab work.    Diagnoses and all " orders for this visit:    Giovanny was seen today for 4 month follow up.    Diagnoses and all orders for this visit:    Essential hypertension  Controlled     Other hyperlipidemia  -     atorvastatin (LIPITOR) 40 MG tablet; Take 1 tablet (40 mg total) by mouth every evening.  Overmedicated    Difficulty walking  Stable     Debility  Slightly improved     Anemia, unspecified type  Monitor off iron     History of ischemic cerebrovascular accident (CVA) with residual deficit  Stable     Plantar fasciitis  Get insoles     Drug-induced constipation  Monitor off iron     Other orders  -     (In Office Administered) Pneumococcal Conjugate Vaccine (13 Valent) (IM)

## 2020-10-26 ENCOUNTER — OFFICE VISIT (OUTPATIENT)
Dept: FAMILY MEDICINE | Facility: CLINIC | Age: 80
End: 2020-10-26
Payer: MEDICARE

## 2020-10-26 VITALS
DIASTOLIC BLOOD PRESSURE: 70 MMHG | OXYGEN SATURATION: 96 % | WEIGHT: 158.75 LBS | HEART RATE: 96 BPM | SYSTOLIC BLOOD PRESSURE: 130 MMHG | BODY MASS INDEX: 24.14 KG/M2

## 2020-10-26 DIAGNOSIS — I10 ESSENTIAL HYPERTENSION: ICD-10-CM

## 2020-10-26 DIAGNOSIS — B86 SCABIES: Primary | ICD-10-CM

## 2020-10-26 DIAGNOSIS — T78.40XA ALLERGIC REACTION, INITIAL ENCOUNTER: ICD-10-CM

## 2020-10-26 DIAGNOSIS — R73.01 IMPAIRED FASTING GLUCOSE: ICD-10-CM

## 2020-10-26 DIAGNOSIS — E78.5 DYSLIPIDEMIA: ICD-10-CM

## 2020-10-26 PROCEDURE — 99999 PR PBB SHADOW E&M-EST. PATIENT-LVL IV: CPT | Mod: PBBFAC,,, | Performed by: PHYSICIAN ASSISTANT

## 2020-10-26 PROCEDURE — 99999 PR PBB SHADOW E&M-EST. PATIENT-LVL IV: ICD-10-PCS | Mod: PBBFAC,,, | Performed by: PHYSICIAN ASSISTANT

## 2020-10-26 PROCEDURE — 99214 PR OFFICE/OUTPT VISIT, EST, LEVL IV, 30-39 MIN: ICD-10-PCS | Mod: S$PBB,,, | Performed by: PHYSICIAN ASSISTANT

## 2020-10-26 PROCEDURE — 99214 OFFICE O/P EST MOD 30 MIN: CPT | Mod: PBBFAC,PO | Performed by: PHYSICIAN ASSISTANT

## 2020-10-26 PROCEDURE — 99214 OFFICE O/P EST MOD 30 MIN: CPT | Mod: S$PBB,,, | Performed by: PHYSICIAN ASSISTANT

## 2020-10-26 RX ORDER — PREDNISONE 10 MG/1
10 TABLET ORAL DAILY
Qty: 5 TABLET | Refills: 0 | Status: SHIPPED | OUTPATIENT
Start: 2020-10-26 | End: 2020-10-29 | Stop reason: ALTCHOICE

## 2020-10-26 RX ORDER — HYDROXYZINE HYDROCHLORIDE 25 MG/1
25 TABLET, FILM COATED ORAL 3 TIMES DAILY PRN
Qty: 30 TABLET | Refills: 0 | Status: SHIPPED | OUTPATIENT
Start: 2020-10-26 | End: 2020-11-06 | Stop reason: SDUPTHER

## 2020-10-26 RX ORDER — PERMETHRIN 50 MG/G
CREAM TOPICAL ONCE
Qty: 60 G | Refills: 0 | Status: SHIPPED | OUTPATIENT
Start: 2020-10-26 | End: 2020-10-26

## 2020-10-26 NOTE — PATIENT INSTRUCTIONS
A few reminders from today:    Start prednisone once daily.   Start hydroxyzine as needed for itching up to 3x daily.   Use topical cream for one application--keep on for 8-12 hours and then shower off.       Follow up with me if needed.   Please go to ER/urgent care if after hours or symptoms persist/worsen.       Do not hesitate to get in touch with me should you have any further questions.     Thank you for trusting me with your care!  I wish you health and happiness.    -Jerri Arevalo PA-C
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
## 2020-10-28 ENCOUNTER — PATIENT MESSAGE (OUTPATIENT)
Dept: FAMILY MEDICINE | Facility: CLINIC | Age: 80
End: 2020-10-28

## 2020-10-29 ENCOUNTER — TELEPHONE (OUTPATIENT)
Dept: FAMILY MEDICINE | Facility: CLINIC | Age: 80
End: 2020-10-29

## 2020-10-29 DIAGNOSIS — T78.40XA ALLERGIC REACTION, INITIAL ENCOUNTER: Primary | ICD-10-CM

## 2020-10-29 RX ORDER — PREDNISONE 5 MG/1
5 TABLET ORAL DAILY
Qty: 5 TABLET | Refills: 0 | Status: SHIPPED | OUTPATIENT
Start: 2020-10-29 | End: 2020-11-03 | Stop reason: ALTCHOICE

## 2020-10-29 NOTE — TELEPHONE ENCOUNTER
Called pt due to most recent message. Seems to be having an allergic reaction to permethrin. Broke out in hives after application, he is currently at urgent care waiting to be seen. Told him we are fully open if he is to need anything further

## 2020-10-29 NOTE — TELEPHONE ENCOUNTER
Spoke to pts wife after urgent care visit. They told him it was an allergic reaction that will liekly get worse before better. Did not give any medication. Wife states pt is itchy and miserable. Has not taken the prednisone 10 per instructions due to sllightly elevated a1c, but I think he can safely handle 5mg daily. Will call that in instead for some symptom control. Wife understands. Will return to care if symptoms worsen.

## 2020-11-03 ENCOUNTER — PATIENT MESSAGE (OUTPATIENT)
Dept: FAMILY MEDICINE | Facility: CLINIC | Age: 80
End: 2020-11-03

## 2020-11-03 DIAGNOSIS — T78.40XA ALLERGIC REACTION, INITIAL ENCOUNTER: Primary | ICD-10-CM

## 2020-11-03 RX ORDER — PREDNISONE 20 MG/1
TABLET ORAL
Qty: 11 TABLET | Refills: 0 | Status: SHIPPED | OUTPATIENT
Start: 2020-11-03 | End: 2020-11-11

## 2020-11-03 NOTE — TELEPHONE ENCOUNTER
Called pt. No answer. Left vm stating for them to call back and schedule. Placed urgent derm referral and sent in higher dose prednisone

## 2020-11-04 ENCOUNTER — TELEPHONE (OUTPATIENT)
Dept: FAMILY MEDICINE | Facility: CLINIC | Age: 80
End: 2020-11-04

## 2020-11-04 ENCOUNTER — PATIENT MESSAGE (OUTPATIENT)
Dept: FAMILY MEDICINE | Facility: CLINIC | Age: 80
End: 2020-11-04

## 2020-11-04 NOTE — TELEPHONE ENCOUNTER
Patient has worsening allergic reaction despite prednisone. Is it possible to get the patient seen before the end of the week. There are some pictures in media of the reaction. Thanks in advance.    Routing comment      Does he see us or allergy?

## 2020-11-06 ENCOUNTER — OFFICE VISIT (OUTPATIENT)
Dept: DERMATOLOGY | Facility: CLINIC | Age: 80
End: 2020-11-06
Payer: MEDICARE

## 2020-11-06 VITALS — RESPIRATION RATE: 20 BRPM | BODY MASS INDEX: 24.14 KG/M2 | HEIGHT: 68 IN

## 2020-11-06 DIAGNOSIS — T78.40XA ALLERGIC REACTION, INITIAL ENCOUNTER: ICD-10-CM

## 2020-11-06 DIAGNOSIS — L29.9 PRURITUS: ICD-10-CM

## 2020-11-06 DIAGNOSIS — L98.9 DISEASE OF SKIN AND SUBCUTANEOUS TISSUE: Primary | ICD-10-CM

## 2020-11-06 PROCEDURE — 88312 SPECIAL STAINS GROUP 1: CPT | Mod: 26,,, | Performed by: PATHOLOGY

## 2020-11-06 PROCEDURE — 88305 TISSUE EXAM BY PATHOLOGIST: CPT | Mod: 26,,, | Performed by: PATHOLOGY

## 2020-11-06 PROCEDURE — 88305 TISSUE EXAM BY PATHOLOGIST: CPT | Performed by: PATHOLOGY

## 2020-11-06 PROCEDURE — 99203 OFFICE O/P NEW LOW 30 MIN: CPT | Mod: 25,S$PBB,, | Performed by: DERMATOLOGY

## 2020-11-06 PROCEDURE — 88312 SPECIAL STAINS GROUP 1: CPT | Performed by: PATHOLOGY

## 2020-11-06 PROCEDURE — 99999 PR PBB SHADOW E&M-EST. PATIENT-LVL III: ICD-10-PCS | Mod: PBBFAC,,, | Performed by: DERMATOLOGY

## 2020-11-06 PROCEDURE — 11104 PR PUNCH BIOPSY, SKIN, SINGLE LESION: ICD-10-PCS | Mod: S$PBB,,, | Performed by: DERMATOLOGY

## 2020-11-06 PROCEDURE — 88312 PR  SPECIAL STAINS,GROUP I: ICD-10-PCS | Mod: 26,,, | Performed by: PATHOLOGY

## 2020-11-06 PROCEDURE — 99999 PR PBB SHADOW E&M-EST. PATIENT-LVL III: CPT | Mod: PBBFAC,,, | Performed by: DERMATOLOGY

## 2020-11-06 PROCEDURE — 99213 OFFICE O/P EST LOW 20 MIN: CPT | Mod: PBBFAC,PO | Performed by: DERMATOLOGY

## 2020-11-06 PROCEDURE — 11104 PUNCH BX SKIN SINGLE LESION: CPT | Mod: S$PBB,,, | Performed by: DERMATOLOGY

## 2020-11-06 PROCEDURE — 88305 TISSUE EXAM BY PATHOLOGIST: ICD-10-PCS | Mod: 26,,, | Performed by: PATHOLOGY

## 2020-11-06 PROCEDURE — 11104 PUNCH BX SKIN SINGLE LESION: CPT | Mod: PBBFAC,PO | Performed by: DERMATOLOGY

## 2020-11-06 PROCEDURE — 99203 PR OFFICE/OUTPT VISIT, NEW, LEVL III, 30-44 MIN: ICD-10-PCS | Mod: 25,S$PBB,, | Performed by: DERMATOLOGY

## 2020-11-06 RX ORDER — A/SINGAPORE/GP1908/2015 IVR-180 (AN A/MICHIGAN/45/2015 (H1N1)PDM09-LIKE VIRUS, A/HONG KONG/4801/2014, NYMC X-263B (H3N2) (AN A/HONG KONG/4801/2014-LIKE VIRUS), AND B/BRISBANE/60/2008, WILD TYPE (A B/BRISBANE/60/2008-LIKE VIRUS) 15; 15; 15 UG/.5ML; UG/.5ML; UG/.5ML
INJECTION, SUSPENSION INTRAMUSCULAR
COMMUNITY
Start: 2020-09-11 | End: 2021-01-19 | Stop reason: ALTCHOICE

## 2020-11-06 RX ORDER — PREDNISONE 10 MG/1
TABLET ORAL
Qty: 14 TABLET | Refills: 0 | Status: SHIPPED | OUTPATIENT
Start: 2020-11-06 | End: 2020-11-17 | Stop reason: ALTCHOICE

## 2020-11-06 RX ORDER — PERMETHRIN 50 MG/G
CREAM TOPICAL
COMMUNITY
Start: 2020-10-26 | End: 2020-11-06

## 2020-11-06 RX ORDER — MOMETASONE FUROATE 1 MG/G
CREAM TOPICAL
Qty: 90 G | Refills: 1 | Status: SHIPPED | OUTPATIENT
Start: 2020-11-06 | End: 2021-01-19

## 2020-11-06 RX ORDER — HYDROXYZINE HYDROCHLORIDE 25 MG/1
25 TABLET, FILM COATED ORAL 3 TIMES DAILY PRN
Qty: 30 TABLET | Refills: 1 | Status: SHIPPED | OUTPATIENT
Start: 2020-11-06 | End: 2020-11-16

## 2020-11-06 NOTE — Clinical Note
Good Afternoon,   Mr. Chaudhari's rash looks like a drug reaction. Recently lipitor changed, lisinopril also a possible culprit. Can we stop them while we await biopsy results? Will defer to you. Thanks

## 2020-11-06 NOTE — PROGRESS NOTES
Subjective:       Patient ID:  Giovanny Chaudhari is a 80 y.o. male who presents for   Chief Complaint   Patient presents with    Rash     Patient present for initial visit, rash.   Started left flank, itchy but not severely. Tolerable. No sick contacts. Accompanied by daughter today. Lives alone. Feeling well otherwise. No viral symptoms  C/o rash to across entire body x 2 weeks, pt states pain is a 0/10, rash per pt appears to be  red, raised, itchy, flat and spreading, pt states no new exposures, Medications, soaps, detergents, or topical applications. pt states heat causes to flare rash.  NO meds prior to March. Had a stroke. Released and had a second stroke- now on plavix , lisinopril, lipitor- recently lipitor decreased. Stopped iron pills    denies ocular, genital or oral mucosal involvement   Treated with:  pt states tried Eucerin cream - help cool down rash  Treated with Permethrin 5% cream, [id number] mg, Prednisone 5 mg once daily for 5 days.  Patient's PCP sent in new prescription of Prednisone dose pack which pt has not started yet    Pt uses:  Soap: Dial gold bar  Detergent: Arm and Hammer (sensitive)  Lotions: Eucerin    Pt hx:  no Allergies  no Asthma  no Eczema  no psoriasis    Family Hx:  no Allergies  no Asthma  no Eczema  no psoriasis    Skin Cancer assessment:  no Phx of NMSC.  yes Fhx of melanoma - brother  Yes Fhx of pancreatic CA - mother  No Fhx of breast CA    Past Medical History:  No date: Debility  No date: History of ischemic cerebrovascular accident (CVA) with   residual deficit  No date: Hypertension  No date: Other hyperlipidemia  No date: Stroke      Comment:  Ischemic CVA      Review of Systems   Constitutional: Negative for fever, chills and fatigue.   HENT: Negative for nosebleeds and congestion.    Respiratory: Negative for cough and shortness of breath.    Gastrointestinal: Negative for vomiting.   Skin: Positive for rash. Negative for itching, sun sensitivity and daily sunscreen  use.   Hematologic/Lymphatic: Bruises/bleeds easily.        Objective:    Physical Exam   Constitutional: He appears well-developed and well-nourished. No distress.   HENT:   Mouth/Throat: Lips normal.    Eyes: No conjunctival no injection.   Cardiovascular: There is no local extremity swelling and no dependent edema.     Neurological: He is alert and oriented to person, place, and time. He is not disoriented.   Psychiatric: He has a normal mood and affect.   Skin:   Areas Examined (abnormalities noted in diagram):   Scalp / Hair Palpated and Inspected  Head / Face Inspection Performed  Neck Inspection Performed  Chest / Axilla Inspection Performed  Abdomen Inspection Performed  Genitals / Buttocks / Groin Inspection Performed  Back Inspection Performed  RUE Inspected  LUE Inspection Performed  RLE Inspected  LLE Inspection Performed  Nails and Digits Inspection Performed              Diagram Legend     Erythematous scaling macule/papule c/w actinic keratosis       Vascular papule c/w angioma      Pigmented verrucoid papule/plaque c/w seborrheic keratosis      Yellow umbilicated papule c/w sebaceous hyperplasia      Irregularly shaped tan macule c/w lentigo     1-2 mm smooth white papules consistent with Milia      Movable subcutaneous cyst with punctum c/w epidermal inclusion cyst      Subcutaneous movable cyst c/w pilar cyst      Firm pink to brown papule c/w dermatofibroma      Pedunculated fleshy papule(s) c/w skin tag(s)      Evenly pigmented macule c/w junctional nevus     Mildly variegated pigmented, slightly irregular-bordered macule c/w mildly atypical nevus      Flesh colored to evenly pigmented papule c/w intradermal nevus       Pink pearly papule/plaque c/w basal cell carcinoma      Erythematous hyperkeratotic cursted plaque c/w SCC      Surgical scar with no sign of skin cancer recurrence      Open and closed comedones      Inflammatory papules and pustules      Verrucoid papule consistent consistent  with wart     Erythematous eczematous patches and plaques     Dystrophic onycholytic nail with subungual debris c/w onychomycosis     Umbilicated papule    Erythematous-base heme-crusted tan verrucoid plaque consistent with inflamed seborrheic keratosis     Erythematous Silvery Scaling Plaque c/w Psoriasis     See annotation          Assessment / Plan:      Pathology Orders:     Normal Orders This Visit    Specimen to Pathology, Dermatology     Comments:    Number of Specimens:->1  ------------------------->-------------------------  Spec 1 Procedure:->Biopsy  Spec 1 Clinical Impression:->drug reaction vs other  Spec 1 Source:->right flank    Questions:    Procedure Type: Dermatology and skin neoplasms    Number of Specimens: 1    ------------------------: -------------------------    Spec 1 Procedure: Biopsy    Spec 1 Clinical Impression: drug reaction vs other    Spec 1 Source: right flank    Clinical Information: pink morbilliform plaques        Disease of skin and subcutaneous tissue  -     Specimen to Pathology, Dermatology  Punch biopsy procedure note:  Punch biopsy performed after verbal consent obtained. Area marked and prepped with alcohol. Approximately 1cc of 1% lidocaine with epinephrine injected. 4 mm disposable punch used to remove lesion. Hemostasis obtained and biopsy site closed with 1 - 2 nylon sutures. Wound care instructions reviewed with patient and handout given.    Pruritus  ?allergic reaction to lisinopril vs lipitor (dose recently changed)  -     mometasone 0.1% (ELOCON) 0.1 % cream; aaa bid  Dispense: 90 g; Refill: 1  Overall improved with 5 days of low dose prednisone  Printed Rx prednisone today, ok to fill if not improving with topical steroids alone  -     predniSONE (DELTASONE) 10 MG tablet; 2 po qam x 4 days, then 1 po qam x 4 days then 1/2 po qam x 4 days  Dispense: 14 tablet; Refill: 0    Allergic reaction, initial encounter  -     hydrOXYzine HCL (ATARAX) 25 MG tablet; Take 1  tablet (25 mg total) by mouth 3 (three) times daily as needed for Itching.  Dispense: 30 tablet; Refill: 1  Discussed risk fall/sedation. Pt states only taking intermittently. Hasnt taken today             Follow up in about 2 weeks (around 11/20/2020) for nurse visit for suture removal.

## 2020-11-12 LAB
COMMENT: NORMAL
FINAL PATHOLOGIC DIAGNOSIS: NORMAL
GROSS: NORMAL
MICROSCOPIC EXAM: NORMAL

## 2020-11-13 ENCOUNTER — PATIENT MESSAGE (OUTPATIENT)
Dept: FAMILY MEDICINE | Facility: CLINIC | Age: 80
End: 2020-11-13

## 2020-11-13 ENCOUNTER — TELEPHONE (OUTPATIENT)
Dept: DERMATOLOGY | Facility: CLINIC | Age: 80
End: 2020-11-13

## 2020-11-13 DIAGNOSIS — I10 ESSENTIAL HYPERTENSION: Primary | ICD-10-CM

## 2020-11-13 NOTE — TELEPHONE ENCOUNTER
Called to notify biopsy results.  Spoke to patient's spouse.  Per Dr. Friedman:  Please call pt to see how he's doing. We received his biopsy results. Rash is co nsistent with a viral or possibly drug eruption. Has he heard from his primary care regarding stopping any of his medications?    Informed spouse of biopsy results for viral vs possible drug eruption.  Spouse stated she has not heard anything from patient's PCP's office, Dr. Luo.  Encouraged spouse to reach out to PCP to see if any changes to patient's medications is needed.  Spouse reports pt had to start taking the steroid pack on 11/7 and has also used all of the steroid cream.  Spouse reports rash is still present.

## 2020-11-16 ENCOUNTER — PATIENT MESSAGE (OUTPATIENT)
Dept: FAMILY MEDICINE | Facility: CLINIC | Age: 80
End: 2020-11-16

## 2020-11-16 RX ORDER — LOSARTAN POTASSIUM 25 MG/1
25 TABLET ORAL DAILY
Qty: 30 TABLET | Refills: 3 | Status: SHIPPED | OUTPATIENT
Start: 2020-11-16 | End: 2021-03-08

## 2020-11-16 NOTE — TELEPHONE ENCOUNTER
I sent in a prescription for losartan 25 mg per day.  Please only take have of it per day for now.  And yes, stop lisinopril.  We can have to monitor his blood pressure because I do not want to going too low.  I am not sure what happened but I just got your message is today.  It seems like there is a delay in the message on my end which is kind of odd.

## 2020-11-17 ENCOUNTER — OFFICE VISIT (OUTPATIENT)
Dept: DERMATOLOGY | Facility: CLINIC | Age: 80
End: 2020-11-17
Payer: MEDICARE

## 2020-11-17 VITALS — RESPIRATION RATE: 20 BRPM | HEIGHT: 68 IN | BODY MASS INDEX: 24.14 KG/M2

## 2020-11-17 DIAGNOSIS — L81.9 POSTINFLAMMATORY PIGMENTARY CHANGES: Primary | ICD-10-CM

## 2020-11-17 DIAGNOSIS — T50.905D ADVERSE EFFECT OF DRUG, SUBSEQUENT ENCOUNTER: ICD-10-CM

## 2020-11-17 PROCEDURE — 99999 PR PBB SHADOW E&M-EST. PATIENT-LVL III: ICD-10-PCS | Mod: PBBFAC,,, | Performed by: DERMATOLOGY

## 2020-11-17 PROCEDURE — 99999 PR PBB SHADOW E&M-EST. PATIENT-LVL III: CPT | Mod: PBBFAC,,, | Performed by: DERMATOLOGY

## 2020-11-17 PROCEDURE — 99213 PR OFFICE/OUTPT VISIT, EST, LEVL III, 20-29 MIN: ICD-10-PCS | Mod: S$PBB,,, | Performed by: DERMATOLOGY

## 2020-11-17 PROCEDURE — 99213 OFFICE O/P EST LOW 20 MIN: CPT | Mod: PBBFAC,PO | Performed by: DERMATOLOGY

## 2020-11-17 PROCEDURE — 99213 OFFICE O/P EST LOW 20 MIN: CPT | Mod: S$PBB,,, | Performed by: DERMATOLOGY

## 2020-11-17 NOTE — PROGRESS NOTES
Subjective:       Patient ID:  Giovanny Chaudhari is a 80 y.o. male who presents for   Chief Complaint   Patient presents with    Follow-up     Pt present with daughter for follow-up, rash s/p biopsy at last visit.  Biopsy suggestive of drug reaction vs viral (less likely clinically). Has two days left of prednisone taper. Felt mometasone alone wasn't helpful. Itching much improved. Pt continued lisinopril and lipitor until hearing from his PCP yesterday. Rash worse with hot baths.     History dermatographism.    Pt reports yesterday his PCP changed his blood pressure medicine to losartan (from lisinopril) and had pt hold taking his atorvastatin for 1 week.    Pt uses:  Soap: Dial gold bar  Detergent: Arm and Hammer (sensitive)  Lotions: Eucerin    Pt hx:  no Allergies  no Asthma  no Eczema  no psoriasis    Family Hx:  no Allergies  no Asthma  no Eczema  no psoriasis    Skin Cancer assessment:  no Phx of NMSC.  yes Fhx of melanoma - brother  Yes Fhx of pancreatic CA - mother  No Fhx of breast CA    Past Medical History:  No date: Debility  No date: History of ischemic cerebrovascular accident (CVA) with   residual deficit  No date: Hypertension  No date: Other hyperlipidemia  No date: Stroke      Comment:  Ischemic CVA      Review of Systems   Constitutional: Negative for fever, chills and fatigue.   HENT: Negative for nosebleeds and congestion.    Respiratory: Negative for cough and shortness of breath.    Gastrointestinal: Negative for vomiting.   Skin: Positive for rash. Negative for itching, sun sensitivity and daily sunscreen use.   Hematologic/Lymphatic: Bruises/bleeds easily.        Objective:    Physical Exam   Constitutional: He appears well-developed and well-nourished. No distress.   HENT:   Mouth/Throat: Lips normal.    Eyes: No conjunctival no injection.   Cardiovascular: There is no local extremity swelling and no dependent edema.     Neurological: He is alert and oriented to person, place, and time. He  is not disoriented.   Psychiatric: He has a normal mood and affect.   Skin:   Areas Examined (abnormalities noted in diagram):   Scalp / Hair Palpated and Inspected  Head / Face Inspection Performed  Neck Inspection Performed  Chest / Axilla Inspection Performed  Abdomen Inspection Performed  Back Inspection Performed  RUE Inspected  LUE Inspection Performed  Nails and Digits Inspection Performed              Diagram Legend     Erythematous scaling macule/papule c/w actinic keratosis       Vascular papule c/w angioma      Pigmented verrucoid papule/plaque c/w seborrheic keratosis      Yellow umbilicated papule c/w sebaceous hyperplasia      Irregularly shaped tan macule c/w lentigo     1-2 mm smooth white papules consistent with Milia      Movable subcutaneous cyst with punctum c/w epidermal inclusion cyst      Subcutaneous movable cyst c/w pilar cyst      Firm pink to brown papule c/w dermatofibroma      Pedunculated fleshy papule(s) c/w skin tag(s)      Evenly pigmented macule c/w junctional nevus     Mildly variegated pigmented, slightly irregular-bordered macule c/w mildly atypical nevus      Flesh colored to evenly pigmented papule c/w intradermal nevus       Pink pearly papule/plaque c/w basal cell carcinoma      Erythematous hyperkeratotic cursted plaque c/w SCC      Surgical scar with no sign of skin cancer recurrence      Open and closed comedones      Inflammatory papules and pustules      Verrucoid papule consistent consistent with wart     Erythematous eczematous patches and plaques     Dystrophic onycholytic nail with subungual debris c/w onychomycosis     Umbilicated papule    Erythematous-base heme-crusted tan verrucoid plaque consistent with inflamed seborrheic keratosis     Erythematous Silvery Scaling Plaque c/w Psoriasis     See annotation      Assessment / Plan:        Postinflammatory pigmentary changes  S/p drug reaction  Reassurance, will improve with time  Stop topical steroid if no longer  itching, scaling  discussed avoiding chronic use and to use only on affected areas- risk atrophy, striae, ecchymoses, hypopigmentation      Adverse effect of drug, subsequent encounter  Lisinopril vs lipitor  Off both at this time  Biopsy results discussed, SR today  Discussed not clinically consistent with scabies             No follow-ups on file.

## 2020-12-11 ENCOUNTER — PATIENT MESSAGE (OUTPATIENT)
Dept: OTHER | Facility: OTHER | Age: 80
End: 2020-12-11

## 2021-01-15 ENCOUNTER — LAB VISIT (OUTPATIENT)
Dept: LAB | Facility: HOSPITAL | Age: 81
End: 2021-01-15
Attending: INTERNAL MEDICINE
Payer: MEDICARE

## 2021-01-15 DIAGNOSIS — I10 ESSENTIAL HYPERTENSION: ICD-10-CM

## 2021-01-15 LAB
BASOPHILS # BLD AUTO: 0.04 K/UL (ref 0–0.2)
BASOPHILS NFR BLD: 0.5 % (ref 0–1.9)
DIFFERENTIAL METHOD: ABNORMAL
EOSINOPHIL # BLD AUTO: 0.1 K/UL (ref 0–0.5)
EOSINOPHIL NFR BLD: 1.1 % (ref 0–8)
ERYTHROCYTE [DISTWIDTH] IN BLOOD BY AUTOMATED COUNT: 12.3 % (ref 11.5–14.5)
HCT VFR BLD AUTO: 41.5 % (ref 40–54)
HGB BLD-MCNC: 13.1 G/DL (ref 14–18)
IMM GRANULOCYTES # BLD AUTO: 0.02 K/UL (ref 0–0.04)
IMM GRANULOCYTES NFR BLD AUTO: 0.2 % (ref 0–0.5)
LYMPHOCYTES # BLD AUTO: 4.1 K/UL (ref 1–4.8)
LYMPHOCYTES NFR BLD: 48.4 % (ref 18–48)
MCH RBC QN AUTO: 30.8 PG (ref 27–31)
MCHC RBC AUTO-ENTMCNC: 31.6 G/DL (ref 32–36)
MCV RBC AUTO: 97 FL (ref 82–98)
MONOCYTES # BLD AUTO: 0.8 K/UL (ref 0.3–1)
MONOCYTES NFR BLD: 9.6 % (ref 4–15)
NEUTROPHILS # BLD AUTO: 3.4 K/UL (ref 1.8–7.7)
NEUTROPHILS NFR BLD: 40.2 % (ref 38–73)
NRBC BLD-RTO: 0 /100 WBC
PLATELET # BLD AUTO: 184 K/UL (ref 150–350)
PMV BLD AUTO: 10 FL (ref 9.2–12.9)
RBC # BLD AUTO: 4.26 M/UL (ref 4.6–6.2)
WBC # BLD AUTO: 8.37 K/UL (ref 3.9–12.7)

## 2021-01-15 PROCEDURE — 80053 COMPREHEN METABOLIC PANEL: CPT

## 2021-01-15 PROCEDURE — 85025 COMPLETE CBC W/AUTO DIFF WBC: CPT

## 2021-01-15 PROCEDURE — 36415 COLL VENOUS BLD VENIPUNCTURE: CPT | Mod: PO

## 2021-01-16 LAB
ALBUMIN SERPL BCP-MCNC: 4 G/DL (ref 3.5–5.2)
ALP SERPL-CCNC: 87 U/L (ref 55–135)
ALT SERPL W/O P-5'-P-CCNC: 23 U/L (ref 10–44)
ANION GAP SERPL CALC-SCNC: 6 MMOL/L (ref 8–16)
AST SERPL-CCNC: 26 U/L (ref 10–40)
BILIRUB SERPL-MCNC: 0.6 MG/DL (ref 0.1–1)
BUN SERPL-MCNC: 23 MG/DL (ref 8–23)
CALCIUM SERPL-MCNC: 9.2 MG/DL (ref 8.7–10.5)
CHLORIDE SERPL-SCNC: 102 MMOL/L (ref 95–110)
CO2 SERPL-SCNC: 29 MMOL/L (ref 23–29)
CREAT SERPL-MCNC: 1.3 MG/DL (ref 0.5–1.4)
EST. GFR  (AFRICAN AMERICAN): 58.7 ML/MIN/1.73 M^2
EST. GFR  (NON AFRICAN AMERICAN): 50.8 ML/MIN/1.73 M^2
GLUCOSE SERPL-MCNC: 139 MG/DL (ref 70–110)
POTASSIUM SERPL-SCNC: 4.7 MMOL/L (ref 3.5–5.1)
PROT SERPL-MCNC: 7.5 G/DL (ref 6–8.4)
SODIUM SERPL-SCNC: 137 MMOL/L (ref 136–145)

## 2021-01-18 ENCOUNTER — PATIENT MESSAGE (OUTPATIENT)
Dept: FAMILY MEDICINE | Facility: CLINIC | Age: 81
End: 2021-01-18

## 2021-01-19 ENCOUNTER — OFFICE VISIT (OUTPATIENT)
Dept: FAMILY MEDICINE | Facility: CLINIC | Age: 81
End: 2021-01-19
Payer: MEDICARE

## 2021-01-19 VITALS
TEMPERATURE: 98 F | WEIGHT: 166.25 LBS | SYSTOLIC BLOOD PRESSURE: 134 MMHG | HEART RATE: 91 BPM | OXYGEN SATURATION: 98 % | BODY MASS INDEX: 25.27 KG/M2 | DIASTOLIC BLOOD PRESSURE: 66 MMHG

## 2021-01-19 DIAGNOSIS — D64.9 ANEMIA, UNSPECIFIED TYPE: ICD-10-CM

## 2021-01-19 DIAGNOSIS — I10 ESSENTIAL HYPERTENSION: Primary | ICD-10-CM

## 2021-01-19 DIAGNOSIS — R73.01 IMPAIRED FASTING GLUCOSE: ICD-10-CM

## 2021-01-19 DIAGNOSIS — E78.49 OTHER HYPERLIPIDEMIA: ICD-10-CM

## 2021-01-19 PROCEDURE — 99999 PR PBB SHADOW E&M-EST. PATIENT-LVL III: ICD-10-PCS | Mod: PBBFAC,,, | Performed by: INTERNAL MEDICINE

## 2021-01-19 PROCEDURE — 99214 PR OFFICE/OUTPT VISIT, EST, LEVL IV, 30-39 MIN: ICD-10-PCS | Mod: S$PBB,,, | Performed by: INTERNAL MEDICINE

## 2021-01-19 PROCEDURE — 99213 OFFICE O/P EST LOW 20 MIN: CPT | Mod: PBBFAC,PO | Performed by: INTERNAL MEDICINE

## 2021-01-19 PROCEDURE — 99999 PR PBB SHADOW E&M-EST. PATIENT-LVL III: CPT | Mod: PBBFAC,,, | Performed by: INTERNAL MEDICINE

## 2021-01-19 PROCEDURE — 99214 OFFICE O/P EST MOD 30 MIN: CPT | Mod: S$PBB,,, | Performed by: INTERNAL MEDICINE

## 2021-05-18 ENCOUNTER — LAB VISIT (OUTPATIENT)
Dept: LAB | Facility: HOSPITAL | Age: 81
End: 2021-05-18
Attending: INTERNAL MEDICINE
Payer: MEDICARE

## 2021-05-18 DIAGNOSIS — I10 ESSENTIAL HYPERTENSION: ICD-10-CM

## 2021-05-18 DIAGNOSIS — D64.9 ANEMIA, UNSPECIFIED TYPE: ICD-10-CM

## 2021-05-18 DIAGNOSIS — R73.01 IMPAIRED FASTING GLUCOSE: ICD-10-CM

## 2021-05-18 LAB
ALBUMIN SERPL BCP-MCNC: 3.8 G/DL (ref 3.5–5.2)
ALP SERPL-CCNC: 93 U/L (ref 55–135)
ALT SERPL W/O P-5'-P-CCNC: 17 U/L (ref 10–44)
ANION GAP SERPL CALC-SCNC: 7 MMOL/L (ref 8–16)
AST SERPL-CCNC: 23 U/L (ref 10–40)
BASOPHILS # BLD AUTO: 0.04 K/UL (ref 0–0.2)
BASOPHILS NFR BLD: 0.5 % (ref 0–1.9)
BILIRUB SERPL-MCNC: 0.9 MG/DL (ref 0.1–1)
BUN SERPL-MCNC: 17 MG/DL (ref 8–23)
CALCIUM SERPL-MCNC: 9.3 MG/DL (ref 8.7–10.5)
CHLORIDE SERPL-SCNC: 105 MMOL/L (ref 95–110)
CHOLEST SERPL-MCNC: 117 MG/DL (ref 120–199)
CHOLEST/HDLC SERPL: 2.2 {RATIO} (ref 2–5)
CO2 SERPL-SCNC: 28 MMOL/L (ref 23–29)
CREAT SERPL-MCNC: 1.2 MG/DL (ref 0.5–1.4)
DIFFERENTIAL METHOD: ABNORMAL
EOSINOPHIL # BLD AUTO: 0.1 K/UL (ref 0–0.5)
EOSINOPHIL NFR BLD: 1.8 % (ref 0–8)
ERYTHROCYTE [DISTWIDTH] IN BLOOD BY AUTOMATED COUNT: 12.3 % (ref 11.5–14.5)
EST. GFR  (AFRICAN AMERICAN): >60 ML/MIN/1.73 M^2
EST. GFR  (NON AFRICAN AMERICAN): 55.6 ML/MIN/1.73 M^2
ESTIMATED AVG GLUCOSE: 137 MG/DL (ref 68–131)
GLUCOSE SERPL-MCNC: 101 MG/DL (ref 70–110)
HBA1C MFR BLD: 6.4 % (ref 4–5.6)
HCT VFR BLD AUTO: 39.6 % (ref 40–54)
HDLC SERPL-MCNC: 53 MG/DL (ref 40–75)
HDLC SERPL: 45.3 % (ref 20–50)
HGB BLD-MCNC: 13 G/DL (ref 14–18)
IMM GRANULOCYTES # BLD AUTO: 0.01 K/UL (ref 0–0.04)
IMM GRANULOCYTES NFR BLD AUTO: 0.1 % (ref 0–0.5)
LDLC SERPL CALC-MCNC: 48.2 MG/DL (ref 63–159)
LYMPHOCYTES # BLD AUTO: 3.8 K/UL (ref 1–4.8)
LYMPHOCYTES NFR BLD: 49.7 % (ref 18–48)
MCH RBC QN AUTO: 30.4 PG (ref 27–31)
MCHC RBC AUTO-ENTMCNC: 32.8 G/DL (ref 32–36)
MCV RBC AUTO: 93 FL (ref 82–98)
MONOCYTES # BLD AUTO: 0.9 K/UL (ref 0.3–1)
MONOCYTES NFR BLD: 12.3 % (ref 4–15)
NEUTROPHILS # BLD AUTO: 2.7 K/UL (ref 1.8–7.7)
NEUTROPHILS NFR BLD: 35.6 % (ref 38–73)
NONHDLC SERPL-MCNC: 64 MG/DL
NRBC BLD-RTO: 0 /100 WBC
PLATELET # BLD AUTO: 178 K/UL (ref 150–450)
PMV BLD AUTO: 9.8 FL (ref 9.2–12.9)
POTASSIUM SERPL-SCNC: 4.2 MMOL/L (ref 3.5–5.1)
PROT SERPL-MCNC: 7.5 G/DL (ref 6–8.4)
RBC # BLD AUTO: 4.28 M/UL (ref 4.6–6.2)
SODIUM SERPL-SCNC: 140 MMOL/L (ref 136–145)
TRIGL SERPL-MCNC: 79 MG/DL (ref 30–150)
WBC # BLD AUTO: 7.67 K/UL (ref 3.9–12.7)

## 2021-05-18 PROCEDURE — 36415 COLL VENOUS BLD VENIPUNCTURE: CPT | Mod: PO | Performed by: INTERNAL MEDICINE

## 2021-05-18 PROCEDURE — 83036 HEMOGLOBIN GLYCOSYLATED A1C: CPT | Performed by: INTERNAL MEDICINE

## 2021-05-18 PROCEDURE — 80061 LIPID PANEL: CPT | Performed by: INTERNAL MEDICINE

## 2021-05-18 PROCEDURE — 80053 COMPREHEN METABOLIC PANEL: CPT | Performed by: INTERNAL MEDICINE

## 2021-05-18 PROCEDURE — 85025 COMPLETE CBC W/AUTO DIFF WBC: CPT | Performed by: INTERNAL MEDICINE

## 2021-05-25 ENCOUNTER — OFFICE VISIT (OUTPATIENT)
Dept: FAMILY MEDICINE | Facility: CLINIC | Age: 81
End: 2021-05-25
Payer: MEDICARE

## 2021-05-25 VITALS
HEIGHT: 68 IN | SYSTOLIC BLOOD PRESSURE: 132 MMHG | HEART RATE: 80 BPM | BODY MASS INDEX: 25.16 KG/M2 | DIASTOLIC BLOOD PRESSURE: 72 MMHG | WEIGHT: 166 LBS | OXYGEN SATURATION: 97 %

## 2021-05-25 DIAGNOSIS — R73.01 IMPAIRED FASTING GLUCOSE: ICD-10-CM

## 2021-05-25 DIAGNOSIS — D64.9 ANEMIA, UNSPECIFIED TYPE: ICD-10-CM

## 2021-05-25 DIAGNOSIS — I10 ESSENTIAL HYPERTENSION: Primary | ICD-10-CM

## 2021-05-25 DIAGNOSIS — E78.49 OTHER HYPERLIPIDEMIA: ICD-10-CM

## 2021-05-25 PROCEDURE — 99214 PR OFFICE/OUTPT VISIT, EST, LEVL IV, 30-39 MIN: ICD-10-PCS | Mod: S$PBB,,, | Performed by: INTERNAL MEDICINE

## 2021-05-25 PROCEDURE — 99214 OFFICE O/P EST MOD 30 MIN: CPT | Mod: S$PBB,,, | Performed by: INTERNAL MEDICINE

## 2021-05-25 PROCEDURE — 99999 PR PBB SHADOW E&M-EST. PATIENT-LVL III: CPT | Mod: PBBFAC,,, | Performed by: INTERNAL MEDICINE

## 2021-05-25 PROCEDURE — 99999 PR PBB SHADOW E&M-EST. PATIENT-LVL III: ICD-10-PCS | Mod: PBBFAC,,, | Performed by: INTERNAL MEDICINE

## 2021-05-25 PROCEDURE — 99213 OFFICE O/P EST LOW 20 MIN: CPT | Mod: PBBFAC,PO | Performed by: INTERNAL MEDICINE

## 2021-08-11 ENCOUNTER — PATIENT MESSAGE (OUTPATIENT)
Dept: FAMILY MEDICINE | Facility: CLINIC | Age: 81
End: 2021-08-11

## 2021-08-12 ENCOUNTER — OFFICE VISIT (OUTPATIENT)
Dept: FAMILY MEDICINE | Facility: CLINIC | Age: 81
End: 2021-08-12
Payer: MEDICARE

## 2021-08-12 DIAGNOSIS — U07.1 COVID-19 VIRUS INFECTION: Primary | ICD-10-CM

## 2021-08-12 DIAGNOSIS — R05.9 COUGH: ICD-10-CM

## 2021-08-12 PROCEDURE — 99213 PR OFFICE/OUTPT VISIT, EST, LEVL III, 20-29 MIN: ICD-10-PCS | Mod: CR,95,, | Performed by: NURSE PRACTITIONER

## 2021-08-12 PROCEDURE — 99213 OFFICE O/P EST LOW 20 MIN: CPT | Mod: CR,95,, | Performed by: NURSE PRACTITIONER

## 2021-08-12 RX ORDER — PREDNISONE 20 MG/1
TABLET ORAL
Qty: 5 TABLET | Refills: 0 | Status: SHIPPED | OUTPATIENT
Start: 2021-08-12 | End: 2021-11-30

## 2021-08-12 RX ORDER — ALBUTEROL SULFATE 90 UG/1
2 AEROSOL, METERED RESPIRATORY (INHALATION) EVERY 6 HOURS PRN
Qty: 6.7 G | Refills: 1 | Status: SHIPPED | OUTPATIENT
Start: 2021-08-12 | End: 2021-10-04

## 2021-08-12 RX ORDER — BENZONATATE 100 MG/1
100 CAPSULE ORAL 3 TIMES DAILY PRN
Qty: 30 CAPSULE | Refills: 0 | Status: SHIPPED | OUTPATIENT
Start: 2021-08-12 | End: 2021-08-22

## 2021-11-30 ENCOUNTER — OFFICE VISIT (OUTPATIENT)
Dept: FAMILY MEDICINE | Facility: CLINIC | Age: 81
End: 2021-11-30
Payer: MEDICARE

## 2021-11-30 VITALS
DIASTOLIC BLOOD PRESSURE: 66 MMHG | HEIGHT: 68 IN | BODY MASS INDEX: 24.72 KG/M2 | HEART RATE: 87 BPM | OXYGEN SATURATION: 98 % | SYSTOLIC BLOOD PRESSURE: 134 MMHG | WEIGHT: 163.13 LBS

## 2021-11-30 DIAGNOSIS — Z00.00 WELLNESS EXAMINATION: Primary | ICD-10-CM

## 2021-11-30 DIAGNOSIS — D64.9 ANEMIA, UNSPECIFIED TYPE: ICD-10-CM

## 2021-11-30 DIAGNOSIS — I69.30 HISTORY OF ISCHEMIC CEREBROVASCULAR ACCIDENT (CVA) WITH RESIDUAL DEFICIT: ICD-10-CM

## 2021-11-30 DIAGNOSIS — R73.01 IMPAIRED FASTING GLUCOSE: ICD-10-CM

## 2021-11-30 DIAGNOSIS — I47.20 VENTRICULAR TACHYCARDIA SEEN ON CARDIAC MONITOR: ICD-10-CM

## 2021-11-30 DIAGNOSIS — E78.49 OTHER HYPERLIPIDEMIA: ICD-10-CM

## 2021-11-30 DIAGNOSIS — I10 ESSENTIAL HYPERTENSION: ICD-10-CM

## 2021-11-30 DIAGNOSIS — I69.354 HEMIPLEGIA AND HEMIPARESIS FOLLOWING CEREBRAL INFARCTION AFFECTING LEFT NON-DOMINANT SIDE: ICD-10-CM

## 2021-11-30 PROCEDURE — 99213 OFFICE O/P EST LOW 20 MIN: CPT | Mod: PBBFAC,PO | Performed by: INTERNAL MEDICINE

## 2021-11-30 PROCEDURE — 99999 PR PBB SHADOW E&M-EST. PATIENT-LVL III: CPT | Mod: PBBFAC,,, | Performed by: INTERNAL MEDICINE

## 2021-11-30 PROCEDURE — 99999 PR PBB SHADOW E&M-EST. PATIENT-LVL III: ICD-10-PCS | Mod: PBBFAC,,, | Performed by: INTERNAL MEDICINE

## 2021-11-30 PROCEDURE — 99214 PR OFFICE/OUTPT VISIT, EST, LEVL IV, 30-39 MIN: ICD-10-PCS | Mod: S$PBB,,, | Performed by: INTERNAL MEDICINE

## 2021-11-30 PROCEDURE — 99214 OFFICE O/P EST MOD 30 MIN: CPT | Mod: S$PBB,,, | Performed by: INTERNAL MEDICINE

## 2021-11-30 RX ORDER — ROSUVASTATIN CALCIUM 5 MG/1
5 TABLET, COATED ORAL DAILY
Qty: 30 TABLET | Refills: 11 | Status: SHIPPED | OUTPATIENT
Start: 2021-11-30 | End: 2022-01-10 | Stop reason: SDUPTHER

## 2022-01-06 ENCOUNTER — PATIENT MESSAGE (OUTPATIENT)
Dept: FAMILY MEDICINE | Facility: CLINIC | Age: 82
End: 2022-01-06
Payer: MEDICARE

## 2022-01-06 DIAGNOSIS — I10 ESSENTIAL HYPERTENSION: ICD-10-CM

## 2022-01-06 DIAGNOSIS — E78.49 OTHER HYPERLIPIDEMIA: ICD-10-CM

## 2022-01-06 DIAGNOSIS — I63.511 ACUTE ISCHEMIC RIGHT MCA STROKE: ICD-10-CM

## 2022-01-10 RX ORDER — ROSUVASTATIN CALCIUM 5 MG/1
5 TABLET, COATED ORAL DAILY
Qty: 90 TABLET | Refills: 3 | Status: SHIPPED | OUTPATIENT
Start: 2022-01-10 | End: 2023-01-17 | Stop reason: SDUPTHER

## 2022-01-10 RX ORDER — CLOPIDOGREL BISULFATE 75 MG/1
75 TABLET ORAL DAILY
Qty: 90 TABLET | Refills: 3 | Status: SHIPPED | OUTPATIENT
Start: 2022-01-10 | End: 2023-01-17 | Stop reason: SDUPTHER

## 2022-01-10 RX ORDER — LOSARTAN POTASSIUM 25 MG/1
25 TABLET ORAL DAILY
Qty: 90 TABLET | Refills: 3 | Status: SHIPPED | OUTPATIENT
Start: 2022-01-10 | End: 2023-01-17 | Stop reason: SDUPTHER

## 2022-01-10 NOTE — TELEPHONE ENCOUNTER
No new care gaps identified.  Powered by [employer]. Reference number: [id number].   1/10/2022 9:10:41 AM CST

## 2022-03-30 ENCOUNTER — DOCUMENTATION ONLY (OUTPATIENT)
Dept: FAMILY MEDICINE | Facility: CLINIC | Age: 82
End: 2022-03-30
Payer: MEDICARE

## 2022-05-02 ENCOUNTER — TELEPHONE (OUTPATIENT)
Dept: FAMILY MEDICINE | Facility: CLINIC | Age: 82
End: 2022-05-02
Payer: MEDICARE

## 2022-05-02 NOTE — TELEPHONE ENCOUNTER
Left message for pt to call back regarding rescheduling his apt due to provider being out of office.

## 2022-05-09 ENCOUNTER — PATIENT MESSAGE (OUTPATIENT)
Dept: SMOKING CESSATION | Facility: CLINIC | Age: 82
End: 2022-05-09
Payer: MEDICARE

## 2022-05-20 ENCOUNTER — LAB VISIT (OUTPATIENT)
Dept: LAB | Facility: HOSPITAL | Age: 82
End: 2022-05-20
Attending: INTERNAL MEDICINE
Payer: MEDICARE

## 2022-05-20 DIAGNOSIS — R73.01 IMPAIRED FASTING GLUCOSE: ICD-10-CM

## 2022-05-20 DIAGNOSIS — D64.9 ANEMIA, UNSPECIFIED TYPE: ICD-10-CM

## 2022-05-20 DIAGNOSIS — E78.49 OTHER HYPERLIPIDEMIA: ICD-10-CM

## 2022-05-20 LAB
ALBUMIN SERPL BCP-MCNC: 3.6 G/DL (ref 3.5–5.2)
ALP SERPL-CCNC: 74 U/L (ref 55–135)
ALT SERPL W/O P-5'-P-CCNC: 15 U/L (ref 10–44)
ANION GAP SERPL CALC-SCNC: 8 MMOL/L (ref 8–16)
AST SERPL-CCNC: 23 U/L (ref 10–40)
BASOPHILS # BLD AUTO: 0.03 K/UL (ref 0–0.2)
BASOPHILS NFR BLD: 0.4 % (ref 0–1.9)
BILIRUB SERPL-MCNC: 1 MG/DL (ref 0.1–1)
BUN SERPL-MCNC: 16 MG/DL (ref 8–23)
CALCIUM SERPL-MCNC: 9.4 MG/DL (ref 8.7–10.5)
CHLORIDE SERPL-SCNC: 104 MMOL/L (ref 95–110)
CHOLEST SERPL-MCNC: 117 MG/DL (ref 120–199)
CHOLEST/HDLC SERPL: 2.3 {RATIO} (ref 2–5)
CO2 SERPL-SCNC: 26 MMOL/L (ref 23–29)
CREAT SERPL-MCNC: 1.3 MG/DL (ref 0.5–1.4)
DIFFERENTIAL METHOD: ABNORMAL
EOSINOPHIL # BLD AUTO: 0.1 K/UL (ref 0–0.5)
EOSINOPHIL NFR BLD: 1.3 % (ref 0–8)
ERYTHROCYTE [DISTWIDTH] IN BLOOD BY AUTOMATED COUNT: 12 % (ref 11.5–14.5)
EST. GFR  (AFRICAN AMERICAN): 57.9 ML/MIN/1.73 M^2
EST. GFR  (NON AFRICAN AMERICAN): 50.1 ML/MIN/1.73 M^2
ESTIMATED AVG GLUCOSE: 131 MG/DL (ref 68–131)
GLUCOSE SERPL-MCNC: 97 MG/DL (ref 70–110)
HBA1C MFR BLD: 6.2 % (ref 4–5.6)
HCT VFR BLD AUTO: 37.3 % (ref 40–54)
HDLC SERPL-MCNC: 50 MG/DL (ref 40–75)
HDLC SERPL: 42.7 % (ref 20–50)
HGB BLD-MCNC: 12.8 G/DL (ref 14–18)
IMM GRANULOCYTES # BLD AUTO: 0.01 K/UL (ref 0–0.04)
IMM GRANULOCYTES NFR BLD AUTO: 0.1 % (ref 0–0.5)
LDLC SERPL CALC-MCNC: 52 MG/DL (ref 63–159)
LYMPHOCYTES # BLD AUTO: 3 K/UL (ref 1–4.8)
LYMPHOCYTES NFR BLD: 45.4 % (ref 18–48)
MCH RBC QN AUTO: 30.5 PG (ref 27–31)
MCHC RBC AUTO-ENTMCNC: 34.3 G/DL (ref 32–36)
MCV RBC AUTO: 89 FL (ref 82–98)
MONOCYTES # BLD AUTO: 0.9 K/UL (ref 0.3–1)
MONOCYTES NFR BLD: 12.7 % (ref 4–15)
NEUTROPHILS # BLD AUTO: 2.7 K/UL (ref 1.8–7.7)
NEUTROPHILS NFR BLD: 40.1 % (ref 38–73)
NONHDLC SERPL-MCNC: 67 MG/DL
NRBC BLD-RTO: 0 /100 WBC
PLATELET # BLD AUTO: 195 K/UL (ref 150–450)
PMV BLD AUTO: 9.2 FL (ref 9.2–12.9)
POTASSIUM SERPL-SCNC: 4.1 MMOL/L (ref 3.5–5.1)
PROT SERPL-MCNC: 7 G/DL (ref 6–8.4)
RBC # BLD AUTO: 4.2 M/UL (ref 4.6–6.2)
SODIUM SERPL-SCNC: 138 MMOL/L (ref 136–145)
TRIGL SERPL-MCNC: 75 MG/DL (ref 30–150)
WBC # BLD AUTO: 6.69 K/UL (ref 3.9–12.7)

## 2022-05-20 PROCEDURE — 83036 HEMOGLOBIN GLYCOSYLATED A1C: CPT | Performed by: INTERNAL MEDICINE

## 2022-05-20 PROCEDURE — 85025 COMPLETE CBC W/AUTO DIFF WBC: CPT | Performed by: INTERNAL MEDICINE

## 2022-05-20 PROCEDURE — 80061 LIPID PANEL: CPT | Performed by: INTERNAL MEDICINE

## 2022-05-20 PROCEDURE — 36415 COLL VENOUS BLD VENIPUNCTURE: CPT | Mod: PO | Performed by: INTERNAL MEDICINE

## 2022-05-20 PROCEDURE — 80053 COMPREHEN METABOLIC PANEL: CPT | Performed by: INTERNAL MEDICINE

## 2022-06-30 ENCOUNTER — OFFICE VISIT (OUTPATIENT)
Dept: FAMILY MEDICINE | Facility: CLINIC | Age: 82
End: 2022-06-30
Payer: MEDICARE

## 2022-06-30 VITALS
OXYGEN SATURATION: 98 % | SYSTOLIC BLOOD PRESSURE: 132 MMHG | BODY MASS INDEX: 24.71 KG/M2 | HEART RATE: 90 BPM | HEIGHT: 68 IN | WEIGHT: 163 LBS | DIASTOLIC BLOOD PRESSURE: 80 MMHG

## 2022-06-30 DIAGNOSIS — I10 ESSENTIAL HYPERTENSION: Primary | ICD-10-CM

## 2022-06-30 DIAGNOSIS — I69.30 HISTORY OF ISCHEMIC CEREBROVASCULAR ACCIDENT (CVA) WITH RESIDUAL DEFICIT: ICD-10-CM

## 2022-06-30 DIAGNOSIS — E78.49 OTHER HYPERLIPIDEMIA: ICD-10-CM

## 2022-06-30 DIAGNOSIS — R26.2 DIFFICULTY WALKING: ICD-10-CM

## 2022-06-30 DIAGNOSIS — R73.01 IMPAIRED FASTING GLUCOSE: ICD-10-CM

## 2022-06-30 DIAGNOSIS — N18.31 CHRONIC KIDNEY DISEASE, STAGE 3A: ICD-10-CM

## 2022-06-30 DIAGNOSIS — I47.20 VENTRICULAR TACHYCARDIA SEEN ON CARDIAC MONITOR: ICD-10-CM

## 2022-06-30 DIAGNOSIS — D64.9 ANEMIA, UNSPECIFIED TYPE: ICD-10-CM

## 2022-06-30 DIAGNOSIS — I69.354 HEMIPLEGIA AND HEMIPARESIS FOLLOWING CEREBRAL INFARCTION AFFECTING LEFT NON-DOMINANT SIDE: ICD-10-CM

## 2022-06-30 PROBLEM — I63.511 ACUTE ISCHEMIC RIGHT MCA STROKE: Status: RESOLVED | Noted: 2020-03-14 | Resolved: 2022-06-30

## 2022-06-30 PROBLEM — E78.5 DYSLIPIDEMIA: Status: RESOLVED | Noted: 2020-03-17 | Resolved: 2022-06-30

## 2022-06-30 PROCEDURE — 99214 OFFICE O/P EST MOD 30 MIN: CPT | Mod: S$PBB,,, | Performed by: INTERNAL MEDICINE

## 2022-06-30 PROCEDURE — 99213 OFFICE O/P EST LOW 20 MIN: CPT | Mod: PBBFAC,PO | Performed by: INTERNAL MEDICINE

## 2022-06-30 PROCEDURE — 99214 PR OFFICE/OUTPT VISIT, EST, LEVL IV, 30-39 MIN: ICD-10-PCS | Mod: S$PBB,,, | Performed by: INTERNAL MEDICINE

## 2022-06-30 PROCEDURE — 99999 PR PBB SHADOW E&M-EST. PATIENT-LVL III: CPT | Mod: PBBFAC,,, | Performed by: INTERNAL MEDICINE

## 2022-06-30 PROCEDURE — 99999 PR PBB SHADOW E&M-EST. PATIENT-LVL III: ICD-10-PCS | Mod: PBBFAC,,, | Performed by: INTERNAL MEDICINE

## 2022-06-30 NOTE — PROGRESS NOTES
Patient ID: Giovanny Chaudhari     Chief Complaint:   Chief Complaint   Patient presents with    wellness exam        HPI:  Routine follow-up for labs that show a very well controlled cholesterol, his A1c is 6.2 but he does not have diabetes.  And a chronic but mild anemia.  He is taking an iron pill daily and I would like him to take it with something acidic to improve its absorption.  I will check an iron level at a later date.  I do not recommend any medicines for the impaired fasting glucose at this time, but I would like him to lower the sugar in his diet.  We discussed routine immunizations and he politely declines COVID vaccine and shingles vaccine at this time.  He believes he has a living will and medical power  at his home and I would like him to mail me a copy.  I provided him with blank copies in case he needs to update them.    Review of Systems   Constitutional: Negative.    HENT: Negative.    Eyes: Negative.    Respiratory: Negative.    Cardiovascular: Negative.    Gastrointestinal: Negative.    Endocrine: Negative.    Genitourinary: Negative.    Musculoskeletal: Negative.    Skin: Negative.    Allergic/Immunologic: Negative.    Neurological: Negative.    Hematological: Negative.    Psychiatric/Behavioral: Negative.           Objective:      Physical Exam   Physical Exam  Vitals and nursing note reviewed.   Constitutional:       Appearance: Normal appearance. He is well-developed.   HENT:      Head: Normocephalic and atraumatic.      Nose: Nose normal.   Eyes:      Extraocular Movements: Extraocular movements intact.      Conjunctiva/sclera: Conjunctivae normal.      Pupils: Pupils are equal, round, and reactive to light.   Cardiovascular:      Rate and Rhythm: Normal rate and regular rhythm.      Pulses: Normal pulses.      Heart sounds: Normal heart sounds.   Pulmonary:      Effort: Pulmonary effort is normal.      Breath sounds: Normal breath sounds.   Abdominal:      General: Bowel sounds are  "normal.      Palpations: Abdomen is soft.   Musculoskeletal:      Cervical back: Normal range of motion and neck supple.      Comments: Left Upper Extremity and Left Lower Extremity weakness   Skin:     General: Skin is warm and dry.      Capillary Refill: Capillary refill takes less than 2 seconds.   Neurological:      General: No focal deficit present.      Mental Status: He is alert and oriented to person, place, and time.   Psychiatric:         Mood and Affect: Mood normal.         Behavior: Behavior normal.         Thought Content: Thought content normal.         Judgment: Judgment normal.            Vitals:   Vitals:    06/30/22 1538   BP: 132/80   Pulse: 90   SpO2: 98%   Weight: 73.9 kg (163 lb)   Height: 5' 8" (1.727 m)          Current Outpatient Medications:     clopidogreL (PLAVIX) 75 mg tablet, Take 1 tablet (75 mg total) by mouth once daily., Disp: 90 tablet, Rfl: 3    losartan (COZAAR) 25 MG tablet, Take 1 tablet (25 mg total) by mouth once daily., Disp: 90 tablet, Rfl: 3    rosuvastatin (CRESTOR) 5 MG tablet, Take 1 tablet (5 mg total) by mouth once daily., Disp: 90 tablet, Rfl: 3    vitamin D (VITAMIN D3) 1000 units Tab, Take 1,000 Units by mouth once daily., Disp: , Rfl:    Assessment:       Patient Active Problem List    Diagnosis Date Noted    Chronic kidney disease, stage 3a 06/30/2022    Ventricular tachycardia seen on cardiac monitor 11/30/2021    Hemiplegia and hemiparesis following cerebral infarction affecting left non-dominant side 11/30/2021    Difficulty walking 10/16/2020    Drug-induced constipation 10/16/2020    Plantar fasciitis 10/16/2020    Anemia 10/16/2020    Impaired fasting glucose 06/10/2020    Other insomnia 06/10/2020    Other hyperlipidemia     History of ischemic cerebrovascular accident (CVA) with residual deficit     Debility     Numbness and tingling in left hand 04/28/2020    Left-sided weakness 03/17/2020    Essential hypertension 03/17/2020    " Unsteady gait 03/15/2020    ACP (advance care planning) 03/14/2020          Plan:       Giovanny Chaudhari  was seen today for follow-up and may need lab work.    Diagnoses and all orders for this visit:    Giovanny was seen today for wellness exam.    Diagnoses and all orders for this visit:    Essential hypertension  Controlled    History of ischemic cerebrovascular accident (CVA) with residual deficit  Stable   Continue Plavix     Other hyperlipidemia  Controlled    Anemia, unspecified type  -     Iron and TIBC; Future  -     Ferritin; Future  -     CBC Auto Differential; Future  Mild/ stable- Monitor     Impaired fasting glucose  -     Hemoglobin A1C; Future  Controlled    Difficulty walking  Stable    Chronic kidney disease, stage 3a  -     Comprehensive Metabolic Panel; Future    Ventricular tachycardia seen on cardiac monitor  Nonsustained and resolved      Hemiplegia and hemiparesis following cerebral infarction affecting left non-dominant side  Stable

## 2022-10-03 DIAGNOSIS — Z71.89 COMPLEX CARE COORDINATION: ICD-10-CM

## 2022-12-29 ENCOUNTER — LAB VISIT (OUTPATIENT)
Dept: LAB | Facility: HOSPITAL | Age: 82
End: 2022-12-29
Attending: INTERNAL MEDICINE
Payer: MEDICARE

## 2022-12-29 DIAGNOSIS — R73.01 IMPAIRED FASTING GLUCOSE: ICD-10-CM

## 2022-12-29 DIAGNOSIS — N18.31 CHRONIC KIDNEY DISEASE, STAGE 3A: ICD-10-CM

## 2022-12-29 DIAGNOSIS — D64.9 ANEMIA, UNSPECIFIED TYPE: ICD-10-CM

## 2022-12-29 LAB
ALBUMIN SERPL BCP-MCNC: 3.9 G/DL (ref 3.5–5.2)
ALP SERPL-CCNC: 65 U/L (ref 55–135)
ALT SERPL W/O P-5'-P-CCNC: 15 U/L (ref 10–44)
ANION GAP SERPL CALC-SCNC: 9 MMOL/L (ref 8–16)
AST SERPL-CCNC: 31 U/L (ref 10–40)
BASOPHILS # BLD AUTO: 0.03 K/UL (ref 0–0.2)
BASOPHILS NFR BLD: 0.5 % (ref 0–1.9)
BILIRUB SERPL-MCNC: 0.8 MG/DL (ref 0.1–1)
BUN SERPL-MCNC: 19 MG/DL (ref 8–23)
CALCIUM SERPL-MCNC: 9.4 MG/DL (ref 8.7–10.5)
CHLORIDE SERPL-SCNC: 104 MMOL/L (ref 95–110)
CO2 SERPL-SCNC: 26 MMOL/L (ref 23–29)
CREAT SERPL-MCNC: 1.3 MG/DL (ref 0.5–1.4)
DIFFERENTIAL METHOD: ABNORMAL
EOSINOPHIL # BLD AUTO: 0.1 K/UL (ref 0–0.5)
EOSINOPHIL NFR BLD: 1.7 % (ref 0–8)
ERYTHROCYTE [DISTWIDTH] IN BLOOD BY AUTOMATED COUNT: 12 % (ref 11.5–14.5)
EST. GFR  (NO RACE VARIABLE): 54.2 ML/MIN/1.73 M^2
ESTIMATED AVG GLUCOSE: 126 MG/DL (ref 68–131)
FERRITIN SERPL-MCNC: 150 NG/ML (ref 20–300)
GLUCOSE SERPL-MCNC: 87 MG/DL (ref 70–110)
HBA1C MFR BLD: 6 % (ref 4–5.6)
HCT VFR BLD AUTO: 38.9 % (ref 40–54)
HGB BLD-MCNC: 12.8 G/DL (ref 14–18)
IMM GRANULOCYTES # BLD AUTO: 0.01 K/UL (ref 0–0.04)
IMM GRANULOCYTES NFR BLD AUTO: 0.2 % (ref 0–0.5)
IRON SERPL-MCNC: 72 UG/DL (ref 45–160)
LYMPHOCYTES # BLD AUTO: 3.4 K/UL (ref 1–4.8)
LYMPHOCYTES NFR BLD: 52.5 % (ref 18–48)
MCH RBC QN AUTO: 31.5 PG (ref 27–31)
MCHC RBC AUTO-ENTMCNC: 32.9 G/DL (ref 32–36)
MCV RBC AUTO: 96 FL (ref 82–98)
MONOCYTES # BLD AUTO: 0.7 K/UL (ref 0.3–1)
MONOCYTES NFR BLD: 10.9 % (ref 4–15)
NEUTROPHILS # BLD AUTO: 2.2 K/UL (ref 1.8–7.7)
NEUTROPHILS NFR BLD: 34.2 % (ref 38–73)
NRBC BLD-RTO: 0 /100 WBC
PLATELET # BLD AUTO: 181 K/UL (ref 150–450)
PMV BLD AUTO: 9.5 FL (ref 9.2–12.9)
POTASSIUM SERPL-SCNC: 3.8 MMOL/L (ref 3.5–5.1)
PROT SERPL-MCNC: 7.1 G/DL (ref 6–8.4)
RBC # BLD AUTO: 4.06 M/UL (ref 4.6–6.2)
SATURATED IRON: 23 % (ref 20–50)
SODIUM SERPL-SCNC: 139 MMOL/L (ref 136–145)
TOTAL IRON BINDING CAPACITY: 312 UG/DL (ref 250–450)
TRANSFERRIN SERPL-MCNC: 211 MG/DL (ref 200–375)
WBC # BLD AUTO: 6.53 K/UL (ref 3.9–12.7)

## 2022-12-29 PROCEDURE — 80053 COMPREHEN METABOLIC PANEL: CPT | Performed by: INTERNAL MEDICINE

## 2022-12-29 PROCEDURE — 36415 COLL VENOUS BLD VENIPUNCTURE: CPT | Mod: PO | Performed by: INTERNAL MEDICINE

## 2022-12-29 PROCEDURE — 84466 ASSAY OF TRANSFERRIN: CPT | Performed by: INTERNAL MEDICINE

## 2022-12-29 PROCEDURE — 83036 HEMOGLOBIN GLYCOSYLATED A1C: CPT | Performed by: INTERNAL MEDICINE

## 2022-12-29 PROCEDURE — 82728 ASSAY OF FERRITIN: CPT | Performed by: INTERNAL MEDICINE

## 2022-12-29 PROCEDURE — 85025 COMPLETE CBC W/AUTO DIFF WBC: CPT | Performed by: INTERNAL MEDICINE

## 2023-01-17 ENCOUNTER — OFFICE VISIT (OUTPATIENT)
Dept: FAMILY MEDICINE | Facility: CLINIC | Age: 83
End: 2023-01-17
Payer: MEDICARE

## 2023-01-17 VITALS
WEIGHT: 158.94 LBS | DIASTOLIC BLOOD PRESSURE: 66 MMHG | HEART RATE: 80 BPM | BODY MASS INDEX: 24.09 KG/M2 | HEIGHT: 68 IN | OXYGEN SATURATION: 95 % | SYSTOLIC BLOOD PRESSURE: 132 MMHG

## 2023-01-17 DIAGNOSIS — L98.9 SKIN LESION: ICD-10-CM

## 2023-01-17 DIAGNOSIS — E78.49 OTHER HYPERLIPIDEMIA: ICD-10-CM

## 2023-01-17 DIAGNOSIS — I10 ESSENTIAL HYPERTENSION: ICD-10-CM

## 2023-01-17 DIAGNOSIS — I47.29 VENTRICULAR TACHYCARDIA (PAROXYSMAL): ICD-10-CM

## 2023-01-17 DIAGNOSIS — I69.30 HISTORY OF ISCHEMIC CEREBROVASCULAR ACCIDENT (CVA) WITH RESIDUAL DEFICIT: ICD-10-CM

## 2023-01-17 DIAGNOSIS — I69.354 HEMIPLEGIA AND HEMIPARESIS FOLLOWING CEREBRAL INFARCTION AFFECTING LEFT NON-DOMINANT SIDE: ICD-10-CM

## 2023-01-17 DIAGNOSIS — Z00.00 WELLNESS EXAMINATION: Primary | ICD-10-CM

## 2023-01-17 DIAGNOSIS — D64.9 ANEMIA, UNSPECIFIED TYPE: ICD-10-CM

## 2023-01-17 DIAGNOSIS — R26.2 DIFFICULTY WALKING: ICD-10-CM

## 2023-01-17 DIAGNOSIS — R73.01 IMPAIRED FASTING GLUCOSE: ICD-10-CM

## 2023-01-17 DIAGNOSIS — N18.31 CHRONIC KIDNEY DISEASE, STAGE 3A: ICD-10-CM

## 2023-01-17 PROBLEM — I47.20 VENTRICULAR TACHYCARDIA (PAROXYSMAL): Status: ACTIVE | Noted: 2023-01-17

## 2023-01-17 PROCEDURE — 99213 OFFICE O/P EST LOW 20 MIN: CPT | Mod: PBBFAC,PO | Performed by: INTERNAL MEDICINE

## 2023-01-17 PROCEDURE — 99999 PR PBB SHADOW E&M-EST. PATIENT-LVL III: ICD-10-PCS | Mod: PBBFAC,,, | Performed by: INTERNAL MEDICINE

## 2023-01-17 PROCEDURE — 99999 PR PBB SHADOW E&M-EST. PATIENT-LVL III: CPT | Mod: PBBFAC,,, | Performed by: INTERNAL MEDICINE

## 2023-01-17 PROCEDURE — 99214 OFFICE O/P EST MOD 30 MIN: CPT | Mod: S$PBB,,, | Performed by: INTERNAL MEDICINE

## 2023-01-17 PROCEDURE — 99214 PR OFFICE/OUTPT VISIT, EST, LEVL IV, 30-39 MIN: ICD-10-PCS | Mod: S$PBB,,, | Performed by: INTERNAL MEDICINE

## 2023-01-17 RX ORDER — LOSARTAN POTASSIUM 25 MG/1
25 TABLET ORAL DAILY
Qty: 90 TABLET | Refills: 3 | Status: SHIPPED | OUTPATIENT
Start: 2023-01-17 | End: 2023-01-23 | Stop reason: SDUPTHER

## 2023-01-17 RX ORDER — CLOPIDOGREL BISULFATE 75 MG/1
75 TABLET ORAL DAILY
Qty: 90 TABLET | Refills: 3 | Status: SHIPPED | OUTPATIENT
Start: 2023-01-17 | End: 2023-05-07 | Stop reason: SDUPTHER

## 2023-01-17 RX ORDER — ROSUVASTATIN CALCIUM 5 MG/1
5 TABLET, COATED ORAL DAILY
Qty: 90 TABLET | Refills: 3 | Status: SHIPPED | OUTPATIENT
Start: 2023-01-17 | End: 2023-05-07 | Stop reason: SDUPTHER

## 2023-01-17 NOTE — PROGRESS NOTES
Patient ID: Giovanny Chaudhari .     Chief Complaint:   Chief Complaint   Patient presents with    6 month follow up    Spot on right side of face        HPI:  Annual exam and doing fairly well overall.  He does have a spot on the right side of his face near his lower cheek that is discolored and has been there for about 4 months.  He says that it looked like an ingrown hair all at time ago but since then he is had issues with it bleeding after he shaves.  I do want to air on the side of caution and refer him to Dermatology so sent him that referral.  He was a bit hesitant because the last time he saw a dermatologist he was diagnosed with scabies and had a rather impressive reaction on his skin to the permethrin cream.  I assured him that the therapy for this spot in his face would not involve that cream I urged him to see the dermatologist.  Otherwise his labs look fairly good.  His A1c has steadily decreased and he has never had diabetes.  His anemia is mild and stable his iron levels are on the low end of normal.  He does take an iron supplement each day and I want him to continue that.  He does need a few refills which I placed for him.  His vital signs do look very good.  He thinks he does have a living will and medical power  in place I would like him to mail me a copy so that I can imported into our system.    Review of Systems   Constitutional: Negative.    HENT: Negative.     Eyes: Negative.    Respiratory: Negative.     Cardiovascular: Negative.    Gastrointestinal: Negative.    Endocrine: Negative.    Genitourinary: Negative.    Musculoskeletal: Negative.    Skin: Negative.    Allergic/Immunologic: Negative.    Neurological: Negative.    Hematological: Negative.    Psychiatric/Behavioral: Negative.          Objective:      Physical Exam   Physical Exam  Vitals and nursing note reviewed.   Constitutional:       Appearance: Normal appearance. He is well-developed.   HENT:      Head: Normocephalic  "and atraumatic.      Nose: Nose normal.      Mouth/Throat:      Mouth: Mucous membranes are moist.   Eyes:      Extraocular Movements: Extraocular movements intact.      Conjunctiva/sclera: Conjunctivae normal.      Pupils: Pupils are equal, round, and reactive to light.   Cardiovascular:      Rate and Rhythm: Normal rate and regular rhythm.      Pulses: Normal pulses.      Heart sounds: Normal heart sounds.   Pulmonary:      Effort: Pulmonary effort is normal.      Breath sounds: Normal breath sounds.   Abdominal:      General: Bowel sounds are normal.      Palpations: Abdomen is soft.   Musculoskeletal:         General: Normal range of motion.      Cervical back: Normal range of motion and neck supple.   Skin:     General: Skin is warm and dry.      Capillary Refill: Capillary refill takes less than 2 seconds.      Findings: Lesion present.      Comments: Small discolored lesion to the right side of his face near the bottom of his cheek.   Neurological:      General: No focal deficit present.      Mental Status: He is alert and oriented to person, place, and time.   Psychiatric:         Mood and Affect: Mood normal.         Behavior: Behavior normal.         Thought Content: Thought content normal.         Judgment: Judgment normal.          Vitals:   Vitals:    01/17/23 1510   BP: 132/66   Pulse: 80   SpO2: 95%   Weight: 72.1 kg (158 lb 15.2 oz)   Height: 5' 8" (1.727 m)          Current Outpatient Medications:     vitamin D (VITAMIN D3) 1000 units Tab, Take 1,000 Units by mouth once daily., Disp: , Rfl:     clopidogreL (PLAVIX) 75 mg tablet, Take 1 tablet (75 mg total) by mouth once daily., Disp: 90 tablet, Rfl: 3    losartan (COZAAR) 25 MG tablet, Take 1 tablet (25 mg total) by mouth once daily., Disp: 90 tablet, Rfl: 3    rosuvastatin (CRESTOR) 5 MG tablet, Take 1 tablet (5 mg total) by mouth once daily., Disp: 90 tablet, Rfl: 3   Assessment:       Patient Active Problem List    Diagnosis Date Noted    " Ventricular tachycardia (paroxysmal) 01/17/2023    Chronic kidney disease, stage 3a 06/30/2022    Hemiplegia and hemiparesis following cerebral infarction affecting left non-dominant side 11/30/2021    Ventricular tachycardia     Difficulty walking 10/16/2020    Drug-induced constipation 10/16/2020    Plantar fasciitis 10/16/2020    Anemia 10/16/2020    Impaired fasting glucose 06/10/2020    Other insomnia 06/10/2020    Other hyperlipidemia     History of ischemic cerebrovascular accident (CVA) with residual deficit     Debility     Numbness and tingling in left hand 04/28/2020    Left-sided weakness 03/17/2020    Essential hypertension 03/17/2020    Unsteady gait 03/15/2020    ACP (advance care planning) 03/14/2020          Plan:       Giovanny Mu Chaudhari Jr.  was seen today for follow-up and may need lab work.    Diagnoses and all orders for this visit:    Giovanny was seen today for 6 month follow up and spot on right side of face.    Diagnoses and all orders for this visit:    Wellness examination    Skin lesion  -     Ambulatory referral/consult to Dermatology; Future    Essential hypertension  -     losartan (COZAAR) 25 MG tablet; Take 1 tablet (25 mg total) by mouth once daily.  Controlled with med     Other hyperlipidemia  -     rosuvastatin (CRESTOR) 5 MG tablet; Take 1 tablet (5 mg total) by mouth once daily.  Controlled with med     History of ischemic cerebrovascular accident (CVA) with residual deficit  -     clopidogreL (PLAVIX) 75 mg tablet; Take 1 tablet (75 mg total) by mouth once daily.  Controlled     Anemia, unspecified type  Chronic and stable     Impaired fasting glucose  Improving     Difficulty walking    Hemiplegia and hemiparesis following cerebral infarction affecting left non-dominant side  Stable     Chronic kidney disease, stage 3a  Improved     Ventricular tachycardia (paroxysmal)  Resolved

## 2023-03-28 ENCOUNTER — TELEPHONE (OUTPATIENT)
Dept: OTOLARYNGOLOGY | Facility: CLINIC | Age: 83
End: 2023-03-28
Payer: MEDICARE

## 2023-03-28 ENCOUNTER — TELEPHONE (OUTPATIENT)
Dept: FAMILY MEDICINE | Facility: CLINIC | Age: 83
End: 2023-03-28
Payer: MEDICARE

## 2023-03-28 ENCOUNTER — TELEPHONE (OUTPATIENT)
Dept: NEUROLOGY | Facility: CLINIC | Age: 83
End: 2023-03-28
Payer: MEDICARE

## 2023-03-28 NOTE — TELEPHONE ENCOUNTER
----- Message from Negin Marquez, CRT sent at 3/28/2023  8:17 AM CDT -----  Regarding: [facility] ER follow up3/27/2023  Dizziness  Please assist patient with follow up.  I believe he has followed with Dr Zuñiga in the past.  You can contact Nik Wilson at  [phone].       Thanks,    Negin Marquez   Emergency Room Navigator/Case Management  [phone]

## 2023-03-28 NOTE — TELEPHONE ENCOUNTER
Spoke with the pts daughter, scheduled follow up with Radha Hutton NP. The pt was previously seen by Radha in 2020 for a stroke. Pt seen in hospital yesterday for new onset vertigo, no signs of stroke, referred to ENT. Thought it would be wise to follow up with Radha Hutton due to hx and length of time from last appt.

## 2023-03-29 NOTE — TELEPHONE ENCOUNTER
----- Message from Mikey Warner sent at 3/28/2023  9:00 AM CDT -----  Type:  Sooner Appointment Request    Caller is requesting a sooner appointment.  Caller declined first available appointment listed below.  Caller will not accept being placed on the waitlist and is requesting a message be sent to doctor.    Name of Caller:  Daughter/ Bobbi  When is the first available appointment?  Pt is scheduled on 03/31/23 but needs to be seen sooner  Symptoms:  ER Follow Up-- Vertigo  Best Call Back Number:  [phone]  Additional Information:

## 2023-03-29 NOTE — TELEPHONE ENCOUNTER
LMOR for pt's daughter, Bobbi re: no opening appt for tomorrow. Friday 3-31-23 is the 1st available. I checked all the other providers in [place].

## 2023-03-31 ENCOUNTER — OFFICE VISIT (OUTPATIENT)
Dept: FAMILY MEDICINE | Facility: CLINIC | Age: 83
End: 2023-03-31
Payer: MEDICARE

## 2023-03-31 VITALS
HEART RATE: 84 BPM | OXYGEN SATURATION: 99 % | DIASTOLIC BLOOD PRESSURE: 65 MMHG | SYSTOLIC BLOOD PRESSURE: 135 MMHG | WEIGHT: 155.88 LBS | BODY MASS INDEX: 23.63 KG/M2 | HEIGHT: 68 IN

## 2023-03-31 DIAGNOSIS — R42 VERTIGO: Primary | ICD-10-CM

## 2023-03-31 DIAGNOSIS — I70.0 AORTIC ATHEROSCLEROSIS: ICD-10-CM

## 2023-03-31 PROCEDURE — 99213 OFFICE O/P EST LOW 20 MIN: CPT | Mod: PBBFAC,PO | Performed by: INTERNAL MEDICINE

## 2023-03-31 PROCEDURE — 99999 PR PBB SHADOW E&M-EST. PATIENT-LVL III: ICD-10-PCS | Mod: PBBFAC,,, | Performed by: INTERNAL MEDICINE

## 2023-03-31 PROCEDURE — 99213 OFFICE O/P EST LOW 20 MIN: CPT | Mod: S$PBB,,, | Performed by: INTERNAL MEDICINE

## 2023-03-31 PROCEDURE — 99999 PR PBB SHADOW E&M-EST. PATIENT-LVL III: CPT | Mod: PBBFAC,,, | Performed by: INTERNAL MEDICINE

## 2023-03-31 PROCEDURE — 99213 PR OFFICE/OUTPT VISIT, EST, LEVL III, 20-29 MIN: ICD-10-PCS | Mod: S$PBB,,, | Performed by: INTERNAL MEDICINE

## 2023-03-31 RX ORDER — MECLIZINE HYDROCHLORIDE 25 MG/1
25 TABLET ORAL 3 TIMES DAILY PRN
Qty: 90 TABLET | Refills: 0 | Status: SHIPPED | OUTPATIENT
Start: 2023-03-31

## 2023-03-31 NOTE — PROGRESS NOTES
"Patient ID: Giovanny Chaudhari Jr.     Chief Complaint:   Chief Complaint   Patient presents with    Follow-up     Hospital Monday. Vertigo         HPI:  Follow-up from a trip to the ER where he went on Monday night due to dizziness occurring after he rolled over in his bed to his left side.  He thought he was dying so he called 911 and that is appropriate.  Workup at that time focused on finding out if he had another stroke but thankfully he did not.  Labs did show a stable anemia that is mild and evidence of a prior stroke but no new findings.  He did have nystagmus on physical exam.  He was diagnosed with vertigo and was given some meclizine which greatly improved his symptoms but his vertigo gets worse when he stops taking it.  He requests a refill on that prescription and I have provided that for him.  On physical exam today, I do not find any nystagmus so I really do think he is getting better.  He does request a handicap day for scar and I will be more than happy to provide that.    Review of Systems       Vertigo     Objective:      Physical Exam   Physical Exam       Normal   No Nystagmus    Vitals:   Vitals:    03/31/23 1546 03/31/23 1606   BP: (!) 142/68 135/65   Pulse: 84    SpO2: 99%    Weight: 70.7 kg (155 lb 13.8 oz)    Height: 5' 8" (1.727 m)           Current Outpatient Medications:     clopidogreL (PLAVIX) 75 mg tablet, Take 1 tablet (75 mg total) by mouth once daily., Disp: 90 tablet, Rfl: 3    losartan (COZAAR) 25 MG tablet, Take 1 tablet (25 mg total) by mouth once daily., Disp: 90 tablet, Rfl: 3    rosuvastatin (CRESTOR) 5 MG tablet, Take 1 tablet (5 mg total) by mouth once daily., Disp: 90 tablet, Rfl: 3    vitamin D (VITAMIN D3) 1000 units Tab, Take 1,000 Units by mouth once daily., Disp: , Rfl:     meclizine (ANTIVERT) 25 mg tablet, Take 1 tablet (25 mg total) by mouth 3 (three) times daily as needed for Dizziness., Disp: 90 tablet, Rfl: 0   Assessment:       Patient Active Problem List    " Diagnosis Date Noted    Aortic atherosclerosis 03/31/2023    Ventricular tachycardia (paroxysmal) 01/17/2023    Chronic kidney disease, stage 3a 06/30/2022    Hemiplegia and hemiparesis following cerebral infarction affecting left non-dominant side 11/30/2021    Ventricular tachycardia     Difficulty walking 10/16/2020    Drug-induced constipation 10/16/2020    Plantar fasciitis 10/16/2020    Anemia 10/16/2020    Impaired fasting glucose 06/10/2020    Other insomnia 06/10/2020    Other hyperlipidemia     History of ischemic cerebrovascular accident (CVA) with residual deficit     Debility     Numbness and tingling in left hand 04/28/2020    Left-sided weakness 03/17/2020    Essential hypertension 03/17/2020    Unsteady gait 03/15/2020    ACP (advance care planning) 03/14/2020          Plan:       Giovanny Chaudhari Jr.  was seen today for follow-up and may need lab work.    Diagnoses and all orders for this visit:    Giovanny was seen today for follow-up.    Diagnoses and all orders for this visit:    Vertigo  -     meclizine (ANTIVERT) 25 mg tablet; Take 1 tablet (25 mg total) by mouth 3 (three) times daily as needed for Dizziness.  Continue meds and keep hydrated    Aortic atherosclerosis  Monitor

## 2023-04-04 ENCOUNTER — OFFICE VISIT (OUTPATIENT)
Dept: NEUROLOGY | Facility: CLINIC | Age: 83
End: 2023-04-04
Payer: MEDICARE

## 2023-04-04 VITALS
HEART RATE: 92 BPM | SYSTOLIC BLOOD PRESSURE: 120 MMHG | HEIGHT: 68 IN | BODY MASS INDEX: 23.22 KG/M2 | DIASTOLIC BLOOD PRESSURE: 69 MMHG | WEIGHT: 153.25 LBS

## 2023-04-04 DIAGNOSIS — I69.30 HISTORY OF ISCHEMIC CEREBROVASCULAR ACCIDENT (CVA) WITH RESIDUAL DEFICIT: ICD-10-CM

## 2023-04-04 DIAGNOSIS — R42 DIZZINESS: ICD-10-CM

## 2023-04-04 PROCEDURE — 99999 PR PBB SHADOW E&M-EST. PATIENT-LVL III: CPT | Mod: PBBFAC,,, | Performed by: NURSE PRACTITIONER

## 2023-04-04 PROCEDURE — 99215 PR OFFICE/OUTPT VISIT, EST, LEVL V, 40-54 MIN: ICD-10-PCS | Mod: S$PBB,,, | Performed by: NURSE PRACTITIONER

## 2023-04-04 PROCEDURE — 99215 OFFICE O/P EST HI 40 MIN: CPT | Mod: S$PBB,,, | Performed by: NURSE PRACTITIONER

## 2023-04-04 PROCEDURE — 99999 PR PBB SHADOW E&M-EST. PATIENT-LVL III: ICD-10-PCS | Mod: PBBFAC,,, | Performed by: NURSE PRACTITIONER

## 2023-04-04 PROCEDURE — 99213 OFFICE O/P EST LOW 20 MIN: CPT | Mod: PBBFAC,PO | Performed by: NURSE PRACTITIONER

## 2023-04-04 NOTE — PROGRESS NOTES
NEUROLOGY  Outpatient Follow Up    [facility]  [address] [facility], [place] [address]  [phone] (office) / [phone] (fax)    Patient Name:  Giovanny Chaudhari Jr.  :  1940  MR #:  [id number]  Acct #:  [id number]    Date of Neurology Visit: 2023  Name of Provider: BRIGIDO Montoya    Other Physicians:  Rodrigue Luo MD (Primary Care Physician); No ref. provider found (Referring)      Chief Complaint: Stroke      History of Present Illness (HPI):  Giovanny Chaudhari is a 79 y.o. right/left handed male.  Patient presented to ED for dizziness on 3/14/2020.  He had reported intermittent dizziness for the past 2 weeks prior to this. The episode of dizziness the previous day caused him to fall sideways and landed on his left arm.  The 2nd fall caused him to injure his right hand.  Patient did not report hitting his head with any of these falls.  No loss of consciousness no vomiting since these episodes. Per report, the next morning his girlfriend came over and noted his speech was dysarthric, left side facial droop, slow, and is movement appeared uncoordinated.  Neuro testing was completed. MRI showed multiple right MCA territory infarcts in the corona radiata and right frontal cortex. Patient discharged home on 3/16/2020.     On 3/17/2020 Patient presented back to [facility] [place] with worsening left-sided weakness and facial droop. Daughter who is present at time of virtual visit states on morning of  pt took his scheduled BP med and was noted to be normal while at home. Once he arrived to [facility] [place] his BP was reported to be low. Mild residual left-sided weakness and facial droop were reported after recent ischemic right-sided CVA. MRI showed evolving right MCA stroke.  His BP medications were placed on hold while in the hospital and adjusted accordingly as per IM team.             Interval Hx 2023:   Patient is here today for stroke and ED follow up. He is accompanied by his  "daughter who helps supply information. She states last Monday he reported dizziness that he describes as head swimming in water. There was no associated focal weakness, visual or speech disturbance. He was taken to the ED where symptoms improved after given Meclizine. He was instructed to see ENT. He denies any further dizziness. He also denies recent falls.           ED Report 3/27/2023  "82-year-old male past medical history notable for HLD, debility, HTN, CVA with residual left-sided weakness presents to emergency department for dizziness.  Patient reports he went to bed at 10:00 p.m. feeling his normal self.  He awoke at 6:00 a.m. feeling well and noted that when he tried to get out of bed at approximately 6:30 a.m. he felt as though his head was swimming in a pool of water.  Patient reports significant lightheadedness with any movement of his head and states that symptoms do resolve when sitting still.      He denies any dysarthria aphasia visual change or new/unilateral numbness or weakness compared to his baseline.  Denies any chest pain shortness breath palpitations dyspnea abdominal pain nausea vomiting diarrhea constipation hematuria or dysuria.      No recent falls or head trauma.      Additional review systems reviewed and negative."        Past Medical, Surgical, Family & Social History:   Reviewed and updated.    Home Medications:     Current Outpatient Medications:     clopidogreL (PLAVIX) 75 mg tablet, Take 1 tablet (75 mg total) by mouth once daily., Disp: 90 tablet, Rfl: 3    losartan (COZAAR) 25 MG tablet, Take 1 tablet (25 mg total) by mouth once daily., Disp: 90 tablet, Rfl: 3    meclizine (ANTIVERT) 25 mg tablet, Take 1 tablet (25 mg total) by mouth 3 (three) times daily as needed for Dizziness., Disp: 90 tablet, Rfl: 0    rosuvastatin (CRESTOR) 5 MG tablet, Take 1 tablet (5 mg total) by mouth once daily., Disp: 90 tablet, Rfl: 3    vitamin D (VITAMIN D3) 1000 units Tab, Take 1,000 Units by " "mouth once daily., Disp: , Rfl:     Physical Examination:  /69 (BP Location: Left arm, Patient Position: Sitting, BP Method: Medium (Automatic))   Pulse 92   Ht 5' 8" (1.727 m)   Wt 69.5 kg (153 lb 3.5 oz)   BMI 23.30 kg/m²     GENERAL:  General appearance: Well, non-toxic appearing.  No apparent distress.     MENTAL STATUS:  Alertness, attention span & concentration: normal.  Language: normal.  Orientation to self, place & time:  normal.     SPEECH:  Clear and fluent.  Follows complex commands.     CRANIAL NERVES:  Cranial Nerves II-XII were examined.  II - Visual fields: normal.  III, IV, VI: PERRL, EOMI, No ptosis, No nystagmus. Exotropia left eye  VII - Face symmetry & mobility: mild left facial droop  VIII - Hearing: normal.  IX, X - Palate: mobile & midline.  XI - Shoulder shrug: normal.  XII - Tongue protrusion: normal.     GROSS MOTOR:  Abnormal movements: none.  Finger-nose normal.  Gait:steppage gait left foot when ambulating; good arm swing  Left side mildly weaker when compared to right          Diagnostic Data Reviewed:     Component      Latest Ref Rng & Units 3/27/2023 12/29/2022 5/20/2022   WBC      3.90 - 12.70 K/uL 4.71     RBC      4.60 - 6.20 M/uL 3.99 (L)     Hemoglobin      14.0 - 18.0 g/dL 12.4 (L)     Hematocrit      40.0 - 54.0 % 37.0 (L)     MCV      82 - 98 fL 93     MCH      27.0 - 31.0 pg 31.1 (H)     MCHC      32.0 - 36.0 g/dL 33.5     RDW      11.5 - 14.5 % 11.6     Platelets      150 - 450 K/uL 170     MPV      9.2 - 12.9 fL 9.6     Immature Granulocytes      0.0 - 0.5 % 0.2     Gran # (ANC)      1.8 - 7.7 K/uL 2.7     Immature Grans (Abs)      0.00 - 0.04 K/uL 0.01     Lymph #      1.0 - 4.8 K/uL 1.2     Mono #      0.3 - 1.0 K/uL 0.7     Eos #      0.0 - 0.5 K/uL 0.2     Baso #      0.00 - 0.20 K/uL 0.03     nRBC      0 /100 WBC 0     Gran %      38.0 - 73.0 % 56.3     Lymph %      18.0 - 48.0 % 25.9     Mono %      4.0 - 15.0 % 13.8     Eosinophil %      0.0 - 8.0 % 3.2   "   Basophil %      0.0 - 1.9 % 0.6     Differential Method       Automated     Sodium      136 - 145 mmol/L 137     Potassium      3.5 - 5.1 mmol/L 4.1     Chloride      95 - 110 mmol/L 104     CO2      22 - 31 mmol/L 26     Glucose      70 - 110 mg/dL 108     BUN      9 - 21 mg/dL 24 (H)     Creatinine      0.50 - 1.40 mg/dL 1.24     Calcium      8.4 - 10.2 mg/dL 8.7     PROTEIN TOTAL      6.0 - 8.4 g/dL 7.2     Albumin      3.5 - 5.2 g/dL 4.1     BILIRUBIN TOTAL      0.2 - 1.3 mg/dL 0.7     Alkaline Phosphatase      38 - 145 U/L 74     AST      17 - 59 U/L 28     ALT      0 - 50 U/L 16     Anion Gap      8 - 16 mmol/L 7 (L)     eGFR      >60 mL/min/1.73 m:2 57 (A)     Cholesterol      120 - 199 mg/dL   117 (L)   Triglycerides      30 - 150 mg/dL   75   HDL      40 - 75 mg/dL   50   LDL Cholesterol External      63.0 - 159.0 mg/dL   52.0 (L)   HDL/Cholesterol Ratio      20.0 - 50.0 %   42.7   Total Cholesterol/HDL Ratio      2.0 - 5.0   2.3   Non-HDL Cholesterol      mg/dL   67   Hemoglobin A1C External      4.0 - 5.6 %  6.0 (H)    Estimated Avg Glucose      68 - 131 mg/dL  126    Alcohol, Serum      <10 mg/dL <10         MRI Brain Without Contrast 3/2023    Narrative  EXAMINATION:  MRI BRAIN WITHOUT CONTRAST    CLINICAL HISTORY:  Mental status change, unknown cause;.    TECHNIQUE:  Multiplanar multisequence MR imaging of the brain was performed without contrast.    COMPARISON:  March 18, 2020    FINDINGS:  Intracranial compartment:    No extra-axial blood or fluid collections.    There are persistent periventricular white matter changes compatible with chronic microvascular ischemia.  Chronic infarct in the right corona radiata is noted.  There is persistent diffuse atrophy.  No mass lesion, acute hemorrhage, edema or acute infarct.    Normal vascular flow voids are preserved.    Skull/extracranial contents (limited evaluation): Bone marrow signal intensity is normal.    Impression  There is no evidence of acute  infarct or other significant acute abnormality.    Stable chronic findings, as above.          CT Head Without Contrast 3/2023    Narrative  EXAMINATION:  CT HEAD WITHOUT CONTRAST    CPT: 56710    CLINICAL HISTORY:  Neuro deficit, acute, stroke suspected;.    TECHNIQUE:  Axial CT slices through the head were obtained without the administration of contrast.  Sagittal and coronal reconstructions were performed.  Automated exposure control was utilized.  Total DLP is approximately 658 mGy cm.    COMPARISON:  CT head 03/17/2020 and MRI brain 03/18/2020    FINDINGS:  Moderate generalized involutional changes are seen.   No evidence of acute intracranial hemorrhage, mass effect, midline deviation, hydrocephalus, or abnormal extra-axial fluid collection is visualized. Remote appearing lacunar infarct in the left basal ganglia is seen.  There appears to be an area of encephalomalacia involving the right corona radiata.  Remote appearing lacunar infarcts in the basal ganglia are noted.  There appears to be  periventricular and deep white matter hypoattenuation which is nonspecific, but is most commonly associated with chronic microvascular ischemic changes.  No evidence of acute large vessel territory ischemia/infarction is appreciated.  Subtle small areas of acute ischemia/infarction may be obscured by background chronic ischemic changes.  MRI with diffusion-weighted imaging is more sensitive in the assessment of acute ischemia/infarction.  The basilar cisterns are preserved. The visualized paranasal sinuses and mastoid air cells appear to be grossly clear.  No acute displaced calvarial fracture is visualized.    Impression  1. No acute intracranial abnormality is visualized.  2. Moderate generalized involutional changes are seen along with findings most compatible with chronic ischemic changes.  Small subtle areas of acute ischemia/infarction may be obscured by background chronic ischemic changes.      MRI Brain 3/14/2020:    1. Multiple right MCA territory infarcts in the corona radiata and right frontal cortex.  At least some of these are acute.  There may be some early subacute infarcts as well.  2. Background mild chronic microvascular ischemic change.     CUS 3/15/2020:   No evidence of hemodynamically significant carotid bifurcation stenosis is demonstrated based on peak systolic velocities and ratios.     MRA Brain 3/15/2020:   1. There is suggestion of truncation of the right middle cerebral trifurcation with findings suggestive of either a nonocclusive thrombus or dissection at the middle cerebral trifurcation.  There is only partial opacification of the M2 segments of the middle cerebral arteries.  2. Left cerebral circulation and posterior fossa circulation are otherwise grossly unremarkable.     MRA neck 3/15/2020:   1. Irregular bilateral carotid bulb plaques with less than 50% diameter stenosis.  The ulcerated plaques can not be totally excluded given the irregularity of the carotid bulb plaques.  2. Mild circumferential stenotic disease involving the origin of the left vertebral artery.     MRI Brain 3/18/2020:   1. New/enlarging areas of acute and subacute ischemia within the right corona radiata.  2. Stable small foci of ischemia within the right frontal cortex.                   Assessment and Plan:    Problem List Items Addressed This Visit          Neuro    History of ischemic cerebrovascular accident (CVA) with residual deficit    Current Assessment & Plan     Pt presented with left side facial droop and dysarthria in March 2020  MRI brain scan revealed right corona radiate infarct   - overall stable  continue Plavix 75 mg QD for stroke prevention  Pt to continue working with PCP for stroke prevention         Stroke education provided            Other    Dizziness    Current Assessment & Plan     Patient is a 83 y/o male that presents following a recent ED visit for dizziness. He reports suddenly developing  dizziness last week    - described as his head swimming in water  It resolved after administration of Meclizine  Brain imaging neg acute   do not suspect central etiology  OK to continue taking meclizine AS NEEDED and not TID routinely  Agree with ENT eval                                  Important to note, also  has a past medical history of Debility, History of ischemic cerebrovascular accident (CVA) with residual deficit, Hypertension, Other hyperlipidemia, and Stroke.          The patient will return to clinic as needed     All questions were answered and patient is comfortable with the plan.         Thank you very much for the opportunity to assist in this patient's care.    If you have any questions or concerns, please do not hesitate to contact me at any time.      Sincerely,     BRIGIDO Montoya  [facility] [place]         I spent a total of 48 minutes on the day of the visit.This includes face to face time and non-face to face time preparing to see the patient (eg, review of tests), Obtaining and/or reviewing separately obtained history, Documenting clinical information in the electronic or other health record, Independently interpreting resultsand communicating results to the patient/family/caregiver, or Care coordination.

## 2023-04-04 NOTE — ASSESSMENT & PLAN NOTE
Patient is a 83 y/o male that presents following a recent ED visit for dizziness. He reports suddenly developing dizziness last week    - described as his head swimming in water  It resolved after administration of Meclizine  Brain imaging neg acute   do not suspect central etiology  OK to continue taking meclizine AS NEEDED and not TID routinely  Agree with ENT eval

## 2023-04-04 NOTE — ASSESSMENT & PLAN NOTE
Pt presented with left side facial droop and dysarthria in March 2020  MRI brain scan revealed right corona radiate infarct   - overall stable  continue Plavix 75 mg QD for stroke prevention  Pt to continue working with PCP for stroke prevention         Stroke education provided

## 2023-04-24 ENCOUNTER — TELEPHONE (OUTPATIENT)
Dept: FAMILY MEDICINE | Facility: CLINIC | Age: 83
End: 2023-04-24
Payer: MEDICARE

## 2023-04-24 NOTE — TELEPHONE ENCOUNTER
Called patient in regards to rescheduling his 7/19 appointment, but received no answer. Voicemail was left.

## 2023-05-03 DIAGNOSIS — Z71.89 COMPLEX CARE COORDINATION: ICD-10-CM

## 2023-05-05 ENCOUNTER — OFFICE VISIT (OUTPATIENT)
Dept: OTOLARYNGOLOGY | Facility: CLINIC | Age: 83
End: 2023-05-05
Payer: MEDICARE

## 2023-05-05 ENCOUNTER — CLINICAL SUPPORT (OUTPATIENT)
Dept: AUDIOLOGY | Facility: CLINIC | Age: 83
End: 2023-05-05
Payer: MEDICARE

## 2023-05-05 VITALS — WEIGHT: 153.44 LBS | BODY MASS INDEX: 23.33 KG/M2

## 2023-05-05 DIAGNOSIS — H90.3 BILATERAL HIGH FREQUENCY SENSORINEURAL HEARING LOSS: Primary | ICD-10-CM

## 2023-05-05 DIAGNOSIS — R42 VERTIGO: Primary | ICD-10-CM

## 2023-05-05 DIAGNOSIS — H81.12 BENIGN PAROXYSMAL POSITIONAL VERTIGO, LEFT: ICD-10-CM

## 2023-05-05 DIAGNOSIS — R42 VERTIGO: ICD-10-CM

## 2023-05-05 DIAGNOSIS — H93.293 IMPAIRED AUDITORY DISCRIMINATION, BILATERAL: ICD-10-CM

## 2023-05-05 DIAGNOSIS — H81.399 OTHER PERIPHERAL VERTIGO, UNSPECIFIED EAR: ICD-10-CM

## 2023-05-05 DIAGNOSIS — H61.23 BILATERAL IMPACTED CERUMEN: ICD-10-CM

## 2023-05-05 PROCEDURE — G0268 REMOVAL OF IMPACTED WAX MD: HCPCS | Mod: S$PBB,,, | Performed by: NURSE PRACTITIONER

## 2023-05-05 PROCEDURE — 99213 OFFICE O/P EST LOW 20 MIN: CPT | Mod: PBBFAC,27,PO | Performed by: NURSE PRACTITIONER

## 2023-05-05 PROCEDURE — 95992 CANALITH REPOSITIONING PROC: CPT | Mod: S$PBB,,, | Performed by: NURSE PRACTITIONER

## 2023-05-05 PROCEDURE — 99213 PR OFFICE/OUTPT VISIT, EST, LEVL III, 20-29 MIN: ICD-10-PCS | Mod: 25,S$PBB,, | Performed by: NURSE PRACTITIONER

## 2023-05-05 PROCEDURE — 99999 PR PBB SHADOW E&M-EST. PATIENT-LVL III: CPT | Mod: PBBFAC,,, | Performed by: NURSE PRACTITIONER

## 2023-05-05 PROCEDURE — 99999 PR PBB SHADOW E&M-EST. PATIENT-LVL III: ICD-10-PCS | Mod: PBBFAC,,, | Performed by: NURSE PRACTITIONER

## 2023-05-05 PROCEDURE — G0268 PR REMOVAL OF IMPACTED WAX MD: ICD-10-PCS | Mod: S$PBB,,, | Performed by: NURSE PRACTITIONER

## 2023-05-05 PROCEDURE — 92567 TYMPANOMETRY: CPT | Mod: PBBFAC,PO | Performed by: AUDIOLOGIST-HEARING AID FITTER

## 2023-05-05 PROCEDURE — 99999 PR PBB SHADOW E&M-EST. PATIENT-LVL I: CPT | Mod: PBBFAC,,, | Performed by: AUDIOLOGIST-HEARING AID FITTER

## 2023-05-05 PROCEDURE — 95992 CANALITH REPOSITIONING PROC: CPT | Mod: PBBFAC,PO | Performed by: NURSE PRACTITIONER

## 2023-05-05 PROCEDURE — 95992 PR CANALITH REPOSITIONING PROCEDURE, PER DAY: ICD-10-PCS | Mod: S$PBB,,, | Performed by: NURSE PRACTITIONER

## 2023-05-05 PROCEDURE — G0268 REMOVAL OF IMPACTED WAX MD: HCPCS | Mod: PBBFAC,PO | Performed by: NURSE PRACTITIONER

## 2023-05-05 PROCEDURE — 92557 COMPREHENSIVE HEARING TEST: CPT | Mod: PBBFAC,PO | Performed by: AUDIOLOGIST-HEARING AID FITTER

## 2023-05-05 PROCEDURE — 99213 OFFICE O/P EST LOW 20 MIN: CPT | Mod: 25,S$PBB,, | Performed by: NURSE PRACTITIONER

## 2023-05-05 PROCEDURE — 99999 PR PBB SHADOW E&M-EST. PATIENT-LVL I: ICD-10-PCS | Mod: PBBFAC,,, | Performed by: AUDIOLOGIST-HEARING AID FITTER

## 2023-05-05 PROCEDURE — 99211 OFF/OP EST MAY X REQ PHY/QHP: CPT | Mod: PBBFAC,PO | Performed by: AUDIOLOGIST-HEARING AID FITTER

## 2023-05-05 NOTE — PROGRESS NOTES
Giovanny Chaudhari Jr. was seen 05/05/2023 for an audiological evaluation. Pertinent complaints today include hearing loss and dizziness. 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 He denies tinnitus. Otoscopy revealed excessive cerumen in the right ear that was removed by Nivia Kincaid NP, ENT, prior to testing. The tympanic membrane was visualized AU prior to proceeding with the hearing testing.     Results reveal a bilateral normal through 1K Hz sloping to severe HF sensorineural hearing loss.    Speech Reception Thresholds were  5 dBHL for the right ear and 10 dBHL for the left ear.    Word recognition scores were fair for the right ear and good for the left ear.   Tympanograms were Type A for the right ear and Type A for the left ear.    Audiogram results were reviewed in detail with patient and all questions were answered. Results will be reviewed by the referring provider at the completion of this note. Recommend further medical evaluation with an ENT provider for the dizziness, binaural amplification pending medical clearance, repeat hearing testing in one year due to noise exposure and bilateral hearing protection with either muffs or in-ear protection in loud noises.

## 2023-05-05 NOTE — PROGRESS NOTES
Subjective     Patient ID: Giovanny Chaudhari Jr. is a 82 y.o. male.    Chief Complaint: No chief complaint on file.    HPI  Patient is new to ENT, referred by Dr. Sneed for consultation for vertigo. Patient was treated for vertigo in ER by Dr. Sneed on 03/27/2023. Negative CT head and MRI brain. Normal ECG. Given meclizine and encouraged to see ENT. He was given more meclizine on 03/31/2023 by Primary Care. He saw neurology on 04/04/2023 who did not suspect central etiology. Patient reports vertigo when turning over in bed for the past 1.5 weeks. He has been taking meclizine TID including twice already today.     Review of Systems   Constitutional: Negative.    HENT: Negative.     Eyes: Negative.    Respiratory: Negative.     Cardiovascular: Negative.    Gastrointestinal: Negative.    Musculoskeletal: Negative.    Integumentary:  Negative.   Neurological: Negative.    Hematological: Negative.    Psychiatric/Behavioral: Negative.          Objective     Physical Exam  Vitals and nursing note reviewed.   Constitutional:       General: He is not in acute distress.     Appearance: He is well-developed. He is not ill-appearing.   HENT:      Head: Normocephalic and atraumatic.      Right Ear: Hearing, tympanic membrane, ear canal and external ear normal. No middle ear effusion. Tympanic membrane is not erythematous.      Left Ear: Hearing, tympanic membrane, ear canal and external ear normal.  No middle ear effusion. Tympanic membrane is not erythematous.      Nose: Nose normal.   Eyes:      General: Lids are normal. No scleral icterus.        Right eye: No discharge.         Left eye: No discharge.   Neck:      Trachea: Trachea normal. No tracheal deviation.   Cardiovascular:      Rate and Rhythm: Normal rate.   Pulmonary:      Effort: Pulmonary effort is normal. No respiratory distress.      Breath sounds: No stridor. No wheezing.   Musculoskeletal:         General: Normal range of motion.      Cervical back: Normal  range of motion.   Skin:     General: Skin is warm and dry.   Neurological:      Mental Status: He is alert and oriented to person, place, and time.      Coordination: Coordination is intact.      Gait: Gait normal.   Psychiatric:         Attention and Perception: Attention normal.         Mood and Affect: Mood normal.         Speech: Speech normal.         Behavior: Behavior normal. Behavior is cooperative.     SEPARATE PROCEDURE IN OFFICE:   Procedure: Removal of impacted cerumen, bilateral   Pre Procedure Diagnosis: Cerumen Impaction   Post Procedure Diagnosis: Cerumen Impaction   Verbal informed consent in regards to risk of trauma to ear canal, ear drum or hearing, discomfort during procedure and/or inability to remove cerumen impaction in one session or unforeseen events or complications.   No anesthesia.     Procedure in detail:   Ear canal visualized bilateral with appropriate size ear speculum utilizing Operating Head Binocular Otomicroscope   Utilizing the following:  Ring curet, delicate alligator forceps, and/or suction cannula was used. The impacted cerumen of the ear canals was removed atraumatically. The TM and EAC were then inspected and found to be clear of wax. See description of TMs/EACs in PE above.   Complications: No   Condition: Improved/Good    Negative [place]-Hallpike HHR  Positive [place]-Hallpike HHL     Assessment and Plan     Problem List Items Addressed This Visit    None  Visit Diagnoses       Bilateral high frequency sensorineural hearing loss    -  Primary    Benign paroxysmal positional vertigo, left        Impaired auditory discrimination, bilateral        Bilateral impacted cerumen            Canalith repositioning maneuvers done by me to correct patient's BPPV Left   Post procedure instructions to prevent recurrence of BPPV were given in writing and reviewed in detail by me  Follow up as needed with ENT, audiologist, or PT to ensure full resolution.  PATIENT IS MEDICALLY CLEARED FOR  HEARING AIDS. The patient's hearing loss is not due to temporarily, correctable physical condition. There are no contraindications to hearing aid candidacy. Patient's audiogram reveals the patient is a candidate for amplification. Audiogram is reviewed in detail with the patient. The audiologist's recommendation that the patient have amplification/hearing aids is discussed and questions answered. Patient has been given information by the audiologist on how to schedule a hearing aid consultation. Patient is encouraged to wear ear protection in loud noise and return annually for hearing test. Return to clinic as needed for further ENT concerns.

## 2023-05-07 ENCOUNTER — PATIENT MESSAGE (OUTPATIENT)
Dept: FAMILY MEDICINE | Facility: CLINIC | Age: 83
End: 2023-05-07
Payer: MEDICARE

## 2023-05-07 DIAGNOSIS — E78.49 OTHER HYPERLIPIDEMIA: ICD-10-CM

## 2023-05-07 DIAGNOSIS — I69.30 HISTORY OF ISCHEMIC CEREBROVASCULAR ACCIDENT (CVA) WITH RESIDUAL DEFICIT: ICD-10-CM

## 2023-05-08 RX ORDER — ROSUVASTATIN CALCIUM 5 MG/1
5 TABLET, COATED ORAL DAILY
Qty: 90 TABLET | Refills: 0 | Status: SHIPPED | OUTPATIENT
Start: 2023-05-08 | End: 2023-08-04

## 2023-05-08 RX ORDER — ROSUVASTATIN CALCIUM 5 MG/1
5 TABLET, COATED ORAL DAILY
Qty: 90 TABLET | Refills: 0 | Status: SHIPPED | OUTPATIENT
Start: 2023-05-08 | End: 2023-05-08 | Stop reason: SDUPTHER

## 2023-05-08 RX ORDER — CLOPIDOGREL BISULFATE 75 MG/1
75 TABLET ORAL DAILY
Qty: 90 TABLET | Refills: 3 | Status: SHIPPED | OUTPATIENT
Start: 2023-05-08 | End: 2023-05-08 | Stop reason: SDUPTHER

## 2023-05-08 RX ORDER — CLOPIDOGREL BISULFATE 75 MG/1
75 TABLET ORAL DAILY
Qty: 90 TABLET | Refills: 3 | Status: SHIPPED | OUTPATIENT
Start: 2023-05-08 | End: 2024-02-21 | Stop reason: SDUPTHER

## 2023-05-08 NOTE — TELEPHONE ENCOUNTER
Provider Staff:  Action required for this patient     Please see care gap opportunities below in Care Due Message.    Thanks!  [facility]     Appointments      Date Provider   Last Visit   3/31/2023 Rodrigue Luo MD   Next Visit   7/19/2023 Rodrigue Luo MD     Refill Decision Note   Giovanny Chaudhari  is requesting a refill authorization.  Brief Assessment and Rationale for Refill:  Approve     Medication Therapy Plan:         Comments:     Note composed:1:03 PM 05/08/2023

## 2023-05-08 NOTE — TELEPHONE ENCOUNTER
Provider Staff:  Action required for this patient     Please see care gap opportunities below in Care Due Message: Lipid panel is still due for patient .     Thanks!  [facility]     Appointments     Last Visit   3/31/2023 Rodrigue Luo MD   Next Visit   7/19/2023 Rodrigue Luo MD     Refill Decision Note   Giovanny Chaudhari  is requesting a refill authorization.  Brief Assessment and Rationale for Refill:  Approve   Hi.  [facility] is no longer accepting medication refill request.  We would like to have the Clopidogrel and Rosuvastatin sent to [facility] please.  Their NCPDP# is [id number].  We greatly appreciate your assistance with this.     Medication Therapy Plan:            Comments:     Note composed:2:39 PM 05/08/2023

## 2023-05-08 NOTE — TELEPHONE ENCOUNTER
Care Due:                  Date            Visit Type   Department     Provider  --------------------------------------------------------------------------------                                MYCHART                              FOLLOWUP/OF  [facility]  Last Visit: 03-      FICE VISIT   MEDICINE       Rodrigue Luo                              EP -                              PRIMARY      [facility]  Next Visit: 07-      CARE ([facility])   MEDICINE       Rodrigue Luo                                                            Last  Test          Frequency    Reason                     Performed    Due Date  --------------------------------------------------------------------------------    Lipid Panel.  12 months..  rosuvastatin.............  05-   05-    [facility] Embedded Care Due Messages. Reference number: [id number].   5/07/2023 9:37:12 PM CDT

## 2023-06-02 ENCOUNTER — OFFICE VISIT (OUTPATIENT)
Dept: DERMATOLOGY | Facility: CLINIC | Age: 83
End: 2023-06-02
Payer: MEDICARE

## 2023-06-02 VITALS — HEIGHT: 68 IN | RESPIRATION RATE: 18 BRPM | WEIGHT: 153.44 LBS | BODY MASS INDEX: 23.26 KG/M2

## 2023-06-02 DIAGNOSIS — D48.5 NEOPLASM OF UNCERTAIN BEHAVIOR OF SKIN: ICD-10-CM

## 2023-06-02 DIAGNOSIS — L72.0 EIC (EPIDERMAL INCLUSION CYST): Primary | ICD-10-CM

## 2023-06-02 PROCEDURE — 99212 OFFICE O/P EST SF 10 MIN: CPT | Mod: 25,S$PBB,, | Performed by: DERMATOLOGY

## 2023-06-02 PROCEDURE — 88305 TISSUE EXAM BY PATHOLOGIST: ICD-10-PCS | Mod: 26,,, | Performed by: PATHOLOGY

## 2023-06-02 PROCEDURE — 99212 PR OFFICE/OUTPT VISIT, EST, LEVL II, 10-19 MIN: ICD-10-PCS | Mod: 25,S$PBB,, | Performed by: DERMATOLOGY

## 2023-06-02 PROCEDURE — 88305 TISSUE EXAM BY PATHOLOGIST: CPT | Mod: PO | Performed by: PATHOLOGY

## 2023-06-02 PROCEDURE — 99213 OFFICE O/P EST LOW 20 MIN: CPT | Mod: PBBFAC,PO,25 | Performed by: DERMATOLOGY

## 2023-06-02 PROCEDURE — 11102 TANGNTL BX SKIN SINGLE LES: CPT | Mod: PBBFAC,PO | Performed by: DERMATOLOGY

## 2023-06-02 PROCEDURE — 99999 PR PBB SHADOW E&M-EST. PATIENT-LVL III: ICD-10-PCS | Mod: PBBFAC,,, | Performed by: DERMATOLOGY

## 2023-06-02 PROCEDURE — 11102 TANGNTL BX SKIN SINGLE LES: CPT | Mod: S$PBB,,, | Performed by: DERMATOLOGY

## 2023-06-02 PROCEDURE — 11102 PR TANGENTIAL BIOPSY, SKIN, SINGLE LESION: ICD-10-PCS | Mod: S$PBB,,, | Performed by: DERMATOLOGY

## 2023-06-02 PROCEDURE — 88305 TISSUE EXAM BY PATHOLOGIST: CPT | Mod: 26,,, | Performed by: PATHOLOGY

## 2023-06-02 PROCEDURE — 99999 PR PBB SHADOW E&M-EST. PATIENT-LVL III: CPT | Mod: PBBFAC,,, | Performed by: DERMATOLOGY

## 2023-06-02 NOTE — PROGRESS NOTES
Subjective:      Patient ID:  Giovanny Chaudhari Jr. is a 83 y.o. male who presents for   Chief Complaint   Patient presents with    Lesion     HPI  Established patient.  Previously seen for drug rash by LT in 2020.   Here today for new issue - new growth at R cheek; x 6 months, bleeds with shaving.   No personal or known family hx skin cancer.      Past Medical History:   Diagnosis Date    Debility     History of ischemic cerebrovascular accident (CVA) with residual deficit     Hypertension     Other hyperlipidemia     Stroke     Ischemic CVA     Review of Systems   Constitutional:  Negative for fever, chills and fatigue.   HENT:  Negative for nosebleeds and congestion.    Respiratory:  Negative for cough and shortness of breath.    Gastrointestinal:  Negative for vomiting.   Skin:  Positive for rash. Negative for itching, sun sensitivity and daily sunscreen use.   Hematologic/Lymphatic: Bruises/bleeds easily.     Objective:   Physical Exam   Constitutional: He appears well-developed and well-nourished. No distress.   Neurological: He is alert and oriented to person, place, and time. He is not disoriented.   Psychiatric: He has a normal mood and affect.   Skin:             Diagram Legend     Erythematous scaling macule/papule c/w actinic keratosis       Vascular papule c/w angioma      Pigmented verrucoid papule/plaque c/w seborrheic keratosis      Yellow umbilicated papule c/w sebaceous hyperplasia      Irregularly shaped tan macule c/w lentigo     1-2 mm smooth white papules consistent with Milia      Movable subcutaneous cyst with punctum c/w epidermal inclusion cyst      Subcutaneous movable cyst c/w pilar cyst      Firm pink to brown papule c/w dermatofibroma      Pedunculated fleshy papule(s) c/w skin tag(s)      Evenly pigmented macule c/w junctional nevus     Mildly variegated pigmented, slightly irregular-bordered macule c/w mildly atypical nevus      Flesh colored to evenly pigmented papule c/w  intradermal nevus       Pink pearly papule/plaque c/w basal cell carcinoma      Erythematous hyperkeratotic cursted plaque c/w SCC      Surgical scar with no sign of skin cancer recurrence      Open and closed comedones      Inflammatory papules and pustules      Verrucoid papule consistent consistent with wart     Erythematous eczematous patches and plaques     Dystrophic onycholytic nail with subungual debris c/w onychomycosis     Umbilicated papule    Erythematous-base heme-crusted tan verrucoid plaque consistent with inflamed seborrheic keratosis     Erythematous Silvery Scaling Plaque c/w Psoriasis     See annotation          Assessment / Plan:      Pathology Orders:       Normal Orders This Visit    Specimen to Pathology, Dermatology     Questions:    Procedure Type: Dermatology and skin neoplasms    Number of Specimens: 1    ------------------------: -------------------------    Spec 1 Procedure: Biopsy    Spec 1 Clinical Impression: isk r/o scc    Spec 1 Source: right lower cheek    Release to patient: Immediate          Neoplasm of uncertain behavior of skin  -     Specimen to Pathology, Dermatology  - Discussed diagnosis with patient and explained uncertain nature of condition, including differential DDX.   - Discussed treatment options (biopsy, close monitoring) with patient, including the risks and benefits of each. Patient opted to pursue biopsy.  - Shave Biopsy Procedure Note: Discussed procedure with patient/patient's guardian including risks and benefits as well as treatment alternatives. Risks of procedure include pain, bleeding, infection, post-inflammatory pigmentary alteration, scar, recurrence. Patient informed that the purpose of a biopsy is sampling of condition in question rather than removal in entirety; further treatment may be necessary. Verbal consent obtained. Area to be biopsied marked and cleansed with alcohol. Local anesthesia achieved by injecting approximately 1 cc of 1% lidocaine  with epinephrine. One shave biopsy performed using a double edge razor blade; specimen submitted to pathology. Hemostasis achieved with aluminum chloride and electrocautery. LN2 to base. Petroleum jelly and bandage applied to wound. Patient tolerated procedure well. After-visit wound care instructions reviewed and provided in writing.     EIC (epidermal inclusion cyst)  - Benign; reassured treatment not necessary.    - given bothersome nature of lesion, pt desires excision. Plan for 30 min excision; discussed r/b/a. RF = Plavix.             Follow up for pending pathology, procedure.

## 2023-06-02 NOTE — PATIENT INSTRUCTIONS
Shave Biopsy Wound Care    Your doctor has performed a shave biopsy today.  A band aid and vaseline ointment has been placed over the site.  This should remain in place for NO LONGER THAN 48 hours.  It is fine to remove the bandaid after 24 hours, if the area is no longer bleeding. It is recommended that you keep the area dry (do not wet)) for the first 24 hours.  After 24 hours, wash the area with warm soap and water and apply Vaseline jelly.  Many patients prefer to use Neosporin or Bacitracin ointment.  This is acceptable; however, know that you can develop an allergy to this medication even if you have used it safely for years.  It is important to keep the area moist.  Letting it dry out and get air slows healing time, and will worsen the scar.        If you notice increasing redness, tenderness, pain, or yellow drainage at the biopsy site, please notify your doctor.  These are signs of an infection.    If your biopsy site is bleeding, apply firm pressure for 15 minutes straight.  Repeat for another 15 minutes, if it is still bleeding.   If the surgical site continues to bleed, then please contact your doctor.      For [facility] users:   You will receive your biopsy results in [facility] as soon as they are available. Please be assured that your physician/provider will review your results and will then determine what further treatment, evaluation, or planning is required. You should be contacted by your physician's/provider's office within 5 business days of receiving your results; If not, please reach out to directly. This is one more way [facility] is putting you first.     [facility] [place] [address]/ [phone] FAX/ [email or web address]

## 2023-06-08 LAB
FINAL PATHOLOGIC DIAGNOSIS: NORMAL
GROSS: NORMAL
Lab: NORMAL
MICROSCOPIC EXAM: NORMAL

## 2023-06-12 NOTE — PROGRESS NOTES
Skin, right lower cheek shave biopsy:   -SQUAMOPROLIFERATIVE LESION, see comment   COMMENT:  Clinical image was reviewed in epic and differential diagnosis noted.  The interpretation of this biopsy is limited due to its superficial nature.  Most of the tissue present is reminiscent of a significantly irritated and inflamed seborrheic   keratosis with actinic changes.  However, an underlying well-differentiated squamous cell carcinoma can not be excluded this time and therefore re-biopsy to include entire depth of the lesion is recommended.     Please call to discuss results / plan / schedule:   Biopsy non specific, please add on to re-evaluate site in 2-3 weeks.

## 2023-06-14 ENCOUNTER — TELEPHONE (OUTPATIENT)
Dept: FAMILY MEDICINE | Facility: CLINIC | Age: 83
End: 2023-06-14
Payer: MEDICARE

## 2023-06-14 NOTE — TELEPHONE ENCOUNTER
Attempted to call patient about his appt on July 19th. Dr. Luo will be out of office July 10th - 21st. We would need to get patient appt reschedule for another day after July 24th.

## 2023-07-19 ENCOUNTER — PROCEDURE VISIT (OUTPATIENT)
Dept: DERMATOLOGY | Facility: CLINIC | Age: 83
End: 2023-07-19
Payer: MEDICARE

## 2023-07-19 DIAGNOSIS — L72.0 EIC (EPIDERMAL INCLUSION CYST): ICD-10-CM

## 2023-07-19 DIAGNOSIS — D48.5 NEOPLASM OF UNCERTAIN BEHAVIOR OF SKIN: Primary | ICD-10-CM

## 2023-07-19 PROCEDURE — 11102 PR TANGENTIAL BIOPSY, SKIN, SINGLE LESION: ICD-10-PCS | Mod: S$PBB,XS,, | Performed by: DERMATOLOGY

## 2023-07-19 PROCEDURE — 99499 UNLISTED E&M SERVICE: CPT | Mod: S$PBB,,, | Performed by: DERMATOLOGY

## 2023-07-19 PROCEDURE — 99499 NO LOS: ICD-10-PCS | Mod: S$PBB,,, | Performed by: DERMATOLOGY

## 2023-07-19 PROCEDURE — 11442 PR EXC SKIN BENIG 1.1-2 CM FACE,FACIAL: ICD-10-PCS | Mod: S$PBB,,, | Performed by: DERMATOLOGY

## 2023-07-19 PROCEDURE — 88305 TISSUE EXAM BY PATHOLOGIST: CPT | Mod: 59,PO | Performed by: PATHOLOGY

## 2023-07-19 PROCEDURE — 11442 EXC FACE-MM B9+MARG 1.1-2 CM: CPT | Mod: PBBFAC,PO | Performed by: DERMATOLOGY

## 2023-07-19 PROCEDURE — 11442 EXC FACE-MM B9+MARG 1.1-2 CM: CPT | Mod: S$PBB,,, | Performed by: DERMATOLOGY

## 2023-07-19 PROCEDURE — 88305 TISSUE EXAM BY PATHOLOGIST: CPT | Mod: 26,,, | Performed by: PATHOLOGY

## 2023-07-19 PROCEDURE — 11102 TANGNTL BX SKIN SINGLE LES: CPT | Mod: 59,PBBFAC,PO | Performed by: DERMATOLOGY

## 2023-07-19 PROCEDURE — 11102 TANGNTL BX SKIN SINGLE LES: CPT | Mod: S$PBB,XS,, | Performed by: DERMATOLOGY

## 2023-07-19 PROCEDURE — 88304 PR  SURG PATH,LEVEL III: ICD-10-PCS | Mod: 26,,, | Performed by: PATHOLOGY

## 2023-07-19 PROCEDURE — 88304 TISSUE EXAM BY PATHOLOGIST: CPT | Mod: 26,,, | Performed by: PATHOLOGY

## 2023-07-19 PROCEDURE — 88305 TISSUE EXAM BY PATHOLOGIST: ICD-10-PCS | Mod: 26,,, | Performed by: PATHOLOGY

## 2023-07-19 NOTE — PROGRESS NOTES
Skin, right lower cheek shave biopsy:   -SQUAMOPROLIFERATIVE LESION, see comment   COMMENT:  Clinical image was reviewed in epic and differential diagnosis noted.  The interpretation of this biopsy is limited due to its superficial nature.  Most of the tissue present is reminiscent of a significantly irritated and inflamed seborrheic   keratosis with actinic changes.  However, an underlying well-differentiated squamous cell carcinoma can not be excluded this time and therefore re-biopsy to include entire depth of the lesion is recommended.     Neoplasm of uncertain behavior of skin  -     Specimen to Pathology, Dermatology  - Discussed diagnosis with patient and explained uncertain nature of condition, including differential DDX.   - Discussed treatment options (biopsy, close monitoring) with patient, including the risks and benefits of each. Patient opted to pursue biopsy.  - Shave Biopsy Procedure Note: Discussed procedure with patient/patient's guardian including risks and benefits as well as treatment alternatives. Risks of procedure include pain, bleeding, infection, post-inflammatory pigmentary alteration, scar, recurrence. Patient informed that the purpose of a biopsy is sampling of condition in question rather than removal in entirety; further treatment may be necessary. Verbal consent obtained. Area to be biopsied marked and cleansed with alcohol. Local anesthesia achieved by injecting approximately 1 cc of 1% lidocaine with epinephrine. One shave biopsy performed using a double edge razor blade; specimen submitted to pathology. Hemostasis achieved with electrocautery. Petroleum jelly and bandage applied to wound. Patient tolerated procedure well. After-visit wound care instructions reviewed and provided in writing.       EIC (epidermal inclusion cyst)  -     Specimen to Pathology, Dermatology  - Benign; reassured.  - Discussed diagnosis, etiology, and treatment options.   - Pt desires excision which is  medication indicated - size, location causing visual field impairment, risk of frequent trauma and irritation.   - Excision Procedure Note: Discussed procedure with patient/patient's guardian including risks and benefits as well as treatment alternatives. Risks of procedure include pain, bleeding, infection, post-inflammatory pigmentary alteration, scar, recurrence. Verbal consent obtained. Area to be excised cleansed with alcohol and marked. Local anesthesia, excision, and closure performed; details listed below. Specimen submitted to pathology. Petroleum jelly and bandage applied to wound. Patient tolerated procedure well. After-visit wound care instructions reviewed and provided in writing. F/u 5-7 days for S/R.   Type of anesthesia: 1% lidocaine with epinephrine   Volume of anesthesia: 3 cc   Scalpel: 15 blade  Type of excision: slit  Size of lesion excised: 1.5 cm   Margins: 0 mm   Size of surgical specimen: 1.5 cm   Type of closure: simple   Buried sutures: n/a  Superficial sutures: 5-0 prolene   Length of closure: n/a

## 2023-07-22 NOTE — PATIENT INSTRUCTIONS
Wound Care    A band aid and vaseline ointment has been placed over the site.  This should remain in place for 24 hours.  It is recommended that you keep the area dry for the first 24 hours.  After 24 hours, you may remove the band aid and wash the area with warm soap and water and apply Vaseline jelly.  Many patients prefer to use Neosporin or Bacitracin ointment.  This is acceptable; however, know that you can develop an allergy to this medication even if you have used it safely for years.  It is important to keep the area moist.  Letting it dry out and get air slows healing time, and will worsen the scar.  Band aid is optional after first 24 hours.      If you notice increasing redness, tenderness, pain, or yellow drainage at the site, please notify your doctor.  These are signs of an infection.    If your site is bleeding, apply firm pressure for 15 minutes straight.  Repeat for another 15 minutes, if it is still bleeding.   If the surgical site continues to bleed, then please contact your doctor.      [facility] [place] [address]/ [phone] FAX/ [email or web address]       Shave Biopsy Wound Care    Your doctor has performed a shave biopsy today.  A band aid and vaseline ointment has been placed over the site.  This should remain in place for NO LONGER THAN 48 hours.  It is fine to remove the bandaid after 24 hours, if the area is no longer bleeding. It is recommended that you keep the area dry (do not wet)) for the first 24 hours.  After 24 hours, wash the area with warm soap and water and apply Vaseline jelly.  Many patients prefer to use Neosporin or Bacitracin ointment.  This is acceptable; however, know that you can develop an allergy to this medication even if you have used it safely for years.  It is important to keep the area moist.  Letting it dry out and get air slows healing time, and will worsen the scar.        If you notice increasing redness, tenderness, pain, or  yellow drainage at the biopsy site, please notify your doctor.  These are signs of an infection.    If your biopsy site is bleeding, apply firm pressure for 15 minutes straight.  Repeat for another 15 minutes, if it is still bleeding.   If the surgical site continues to bleed, then please contact your doctor.      For [facility] users:   You will receive your biopsy results in [facility] as soon as they are available. Please be assured that your physician/provider will review your results and will then determine what further treatment, evaluation, or planning is required. You should be contacted by your physician's/provider's office within 5 business days of receiving your results; If not, please reach out to directly. This is one more way [facility] is putting you first.     [facility] [place] [address]/ [phone] FAX/ [email or web address]

## 2023-07-26 LAB
FINAL PATHOLOGIC DIAGNOSIS: NORMAL
Lab: NORMAL

## 2023-07-26 NOTE — PROGRESS NOTES
A.  SKIN, RIGHT SUPERIOR CHEEK LESION, EXCISION:   - Follicular infundibular cyst (epidermal inclusion cyst).   B.  SKIN, RIGHT LOWER CHEEK LESION, SHAVE BIOPSY:   - Invasive squamous cell carcinoma, well-moderately differentiated, transected.     Please call to discuss results / plan / schedule:   A) benign cyst, no further treatment.   B) repeat bx showing squamous cell carcinoma skin cancer. Recommend Mohs surgery with Dr Mullins. Once pt notified of result and recommended treatment, please forward result to Mohs team for scheduling. Thank you.

## 2023-07-27 ENCOUNTER — TELEPHONE (OUTPATIENT)
Dept: DERMATOLOGY | Facility: CLINIC | Age: 83
End: 2023-07-27
Payer: MEDICARE

## 2023-07-27 DIAGNOSIS — D48.5 NEOPLASM OF UNCERTAIN BEHAVIOR OF SKIN: Primary | ICD-10-CM

## 2023-07-27 NOTE — TELEPHONE ENCOUNTER
----- Message from Nicole Armenta RN sent at 7/26/2023  1:16 PM CDT -----  Regarding: Mohs    ----- Message -----  From: Kari Langston MD  Sent: 7/26/2023  12:38 PM CDT  To: [facility] Staff    A.  SKIN, RIGHT SUPERIOR CHEEK LESION, EXCISION:   - Follicular infundibular cyst (epidermal inclusion cyst).   B.  SKIN, RIGHT LOWER CHEEK LESION, SHAVE BIOPSY:   - Invasive squamous cell carcinoma, well-moderately differentiated, transected.     Please call to discuss results / plan / schedule:   A) benign cyst, no further treatment.   B) repeat bx showing squamous cell carcinoma skin cancer. Recommend Mohs surgery with Dr Mullins. Once pt notified of result and recommended treatment, please forward result to Mohs team for scheduling. Thank you.

## 2023-08-09 ENCOUNTER — PROCEDURE VISIT (OUTPATIENT)
Dept: DERMATOLOGY | Facility: CLINIC | Age: 83
End: 2023-08-09
Payer: MEDICARE

## 2023-08-09 VITALS
BODY MASS INDEX: 23.84 KG/M2 | SYSTOLIC BLOOD PRESSURE: 137 MMHG | HEIGHT: 67 IN | DIASTOLIC BLOOD PRESSURE: 74 MMHG | HEART RATE: 94 BPM | WEIGHT: 151.88 LBS

## 2023-08-09 DIAGNOSIS — D48.5 NEOPLASM OF UNCERTAIN BEHAVIOR OF SKIN: ICD-10-CM

## 2023-08-09 DIAGNOSIS — C44.320 SCC (SQUAMOUS CELL CARCINOMA), FACE: Primary | ICD-10-CM

## 2023-08-09 PROCEDURE — 88305 TISSUE EXAM BY PATHOLOGIST: ICD-10-PCS | Mod: 26,,, | Performed by: PATHOLOGY

## 2023-08-09 PROCEDURE — 88305 TISSUE EXAM BY PATHOLOGIST: CPT | Mod: 59,PO | Performed by: PATHOLOGY

## 2023-08-09 PROCEDURE — 17311 MOHS 1 STAGE H/N/HF/G: CPT | Mod: PBBFAC,PO | Performed by: DERMATOLOGY

## 2023-08-09 PROCEDURE — 17311: ICD-10-PCS | Mod: S$PBB,,, | Performed by: DERMATOLOGY

## 2023-08-09 PROCEDURE — 13132 CMPLX RPR F/C/C/M/N/AX/G/H/F: CPT | Mod: S$PBB,XS,51, | Performed by: DERMATOLOGY

## 2023-08-09 PROCEDURE — 11102 PR TANGENTIAL BIOPSY, SKIN, SINGLE LESION: ICD-10-PCS | Mod: S$PBB,XS,, | Performed by: DERMATOLOGY

## 2023-08-09 PROCEDURE — 88305 TISSUE EXAM BY PATHOLOGIST: CPT | Mod: 26,,, | Performed by: PATHOLOGY

## 2023-08-09 PROCEDURE — 17311 MOHS 1 STAGE H/N/HF/G: CPT | Mod: S$PBB,,, | Performed by: DERMATOLOGY

## 2023-08-09 PROCEDURE — 11102 TANGNTL BX SKIN SINGLE LES: CPT | Mod: S$PBB,XS,, | Performed by: DERMATOLOGY

## 2023-08-09 PROCEDURE — 13132 CMPLX RPR F/C/C/M/N/AX/G/H/F: CPT | Mod: PBBFAC,XS,51,PO | Performed by: DERMATOLOGY

## 2023-08-09 PROCEDURE — 11102 TANGNTL BX SKIN SINGLE LES: CPT | Mod: PBBFAC,PO | Performed by: DERMATOLOGY

## 2023-08-09 PROCEDURE — 13132 PR RECMPL WND HEAD,FAC,HAND 2.6-7.5 CM: ICD-10-PCS | Mod: S$PBB,XS,51, | Performed by: DERMATOLOGY

## 2023-08-09 NOTE — PROGRESS NOTES
Dermatology Outpatient Clinic Progress Note    Patient Name: Giovanny Chaudhari Jr.  Patient : 1940  Date of Service: 2023    CC/HPI: Giovanny Chaudhari Jr. is a 83 y.o. year old male with history of  NMSC  who presents today for biopsy left temple.  Patient reports bleeding     ROS:   Dermatological ROS: positive for skin lesion changes    Physical Exam   Head           Diagram Legend     Erythematous scaling macule/papule c/w actinic keratosis       Vascular papule c/w angioma      Pigmented verrucoid papule/plaque c/w seborrheic keratosis      Yellow umbilicated papule c/w sebaceous hyperplasia      Irregularly shaped tan macule c/w lentigo     1-2 mm smooth white papules consistent with Milia      Movable subcutaneous cyst with punctum c/w epidermal inclusion cyst      Subcutaneous movable cyst c/w pilar cyst      Firm pink to brown papule c/w dermatofibroma      Pedunculated fleshy papule(s) c/w skin tag(s)      Evenly pigmented macule c/w junctional nevus     Mildly variegated pigmented, slightly irregular-bordered macule c/w mildly atypical nevus      Flesh colored to evenly pigmented papule c/w intradermal nevus       Pink pearly papule/plaque c/w basal cell carcinoma      Erythematous hyperkeratotic cursted plaque c/w SCC      Surgical scar with no sign of skin cancer recurrence      Open and closed comedones      Inflammatory papules and pustules      Verrucoid papule consistent consistent with wart     Erythematous eczematous patches and plaques     Dystrophic onycholytic nail with subungual debris c/w onychomycosis     Umbilicated papule    Erythematous-base heme-crusted tan verrucoid plaque consistent with inflamed seborrheic keratosis     Erythematous Silvery Scaling Plaque c/w Psoriasis     See annotation    Assessment/Plan:    Diagnoses and all orders for this visit:    SCC (squamous cell carcinoma), face    Neoplasm of uncertain behavior of skin  -     [facility] Surgery  -     Specimen to  Pathology, Dermatology  Shave Biopsy Procedure Note  Risks, benefits, limitations of shave biopsy discussed. Areas cleansed with alcohol, photographs of suspicious lesions taken in [place]. 1 lesion(s) numbed with 1% lidocaine with 1:200,000 epinephrine. Lesions removed with razor surgery and sent for pathology in formalin. Hemostasis achieved with alumuninum chloride. Polysporin and a band-aid applied.  Will call patient with results once available. Patient tolerated procedure well.         No follow-ups on file.        Uyen Mullins MD

## 2023-08-09 NOTE — PROGRESS NOTES
[facility] MICROGRAPHIC SURGERY OPERATIVE NOTE  Name:  Giovanny Chaudhari Jr.  Date: 2023  Patient : 1940  Attending Surgeon: Uyen Mullins MD  Assistants: Ana Arevalo - Surgical Technician  Anesthetic Agent: 1% lidocaine with 1:100,000 epinephrine  Clinical Diagnosis: squamous cell carcinoma   Operation: Mohs Micrographic Surgery  Location: right lower cheek  Indications: Location in non-mask areas of face where maximum conservation of tumor-free tissue is needed.  Surgical Preparation: povidone-iodine     Description of Operation:  The nature and purpose of the procedure, associated risks and alternative treatments were explained to the patient in detail. All patient questions were answered completely. An informed operative consent and photography permit were obtained. The tumor location was then identified and marked with agreement by the patient of the correct location. The patient was positioned, prepped, and draped in the usual sterile manner. Local anesthesia was obtained with 2 cc(s) of 1% lidocaine with 1:100,000 epinephrine. The lesion pre-operatively measures 0.9 by 0.8 cm.     1ST STAGE:  A 2+ mm rim of normal appearing skin was marked circumferentially around the lesion after scraping with a curette to define the margin. The area thus outlined was excised at a 45 degree angle. Hemostasis was obtained with electrodesiccation. The specimen was oriented, mapped, and subdivided into at least two sections. Sections were then chromacoded and submitted for horizontal frozen sections. The patient tolerated the procedure well and there were no complications. Upon microscopic examination of the processed horizontal frozen sections of this stage:  No residual tumor was present.  Tumor type noted on stage 1: No tumor seen.        SUMMARY:  The tumor was extirpated in 1 Mohs Stages resulting in a final defect measuring 1.4 by 1.3 cm².   The final defect extended deep to subcutaneous fat  The  patient tolerated the procedure well and no complications were noted.     PHOTOS:      Uyen Mullins MD    Complex Linear Closure Operative Note  Name: Giovanny Chaudhari Jr.  YOB: 1940  Date: 8/9/2023  Attending Surgeon: Uyen Mullins MD FAAD  Assistants:  Ana Arevalo - Surgical Technician  Clinical Diagnosis: A 1.4 by 1.3 cm defect secondary to Mohs Micrographic Surgery  Location: right lower cheek  Operation: Complex linear closure  Surgical Preparation: broad scrub with povidone-iodine    Description of Operation:  The nature and purpose of the procedure, associated risks and alternative treatments were explained to the patient in detail. All patient questions were answered completely. In order to minimize tension on the closure and avoid compromising the anatomic contour of the cosmetic region and maximize functional capacity, a complex linear closure was performed. An informed operative consent and photography permit were obtained. The patient was positioned, prepped, and draped in the usual sterile manner. Local anesthesia was obtained with 4 cc(s) of 1% lidocaine with 1:100,000 epinephrine.    The defect edges were debeveled with a #15 scalpel blade. The circular defect was widely undermined in the deep subcutaneous plane at least 100% of the defect diameter to allow for maximum tissue movement. Hemostasis was achieved with spot electrodessication. The defect was closed first utilizing multiple 4-0 Vicryl interrupted buried subcutaneous sutures. This resulted in excellent apposition of the dermis and epidermis, however two redundant areas of tissue were created on opposite sides of the defect. Utilizing a #15 scalpel blade, two Burows triangles were excised to ensure a flat scar. Hemostasis was obtained with spot electrodessication. The skin edges throughout further fastened superficially with 5-0 Nylon. The final length of the repair was 4.2 cm.  The patient tolerated the  procedure well and there were no complications.  Polysporin, Telfa and a pressure dressing were applied to sutures after gentle cleansing with saline.    Patient instructed in and provided with instructions for post-op care, will RTC in 7 days for suture removal    Post op meds: None    Photos:        Uyen Mullins MD

## 2023-08-14 LAB
FINAL PATHOLOGIC DIAGNOSIS: NORMAL
GROSS: NORMAL
Lab: NORMAL
MICROSCOPIC EXAM: NORMAL

## 2023-08-16 ENCOUNTER — CLINICAL SUPPORT (OUTPATIENT)
Dept: DERMATOLOGY | Facility: CLINIC | Age: 83
End: 2023-08-16
Payer: MEDICARE

## 2023-08-16 DIAGNOSIS — Z48.02 ENCOUNTER FOR REMOVAL OF SUTURES: Primary | ICD-10-CM

## 2023-08-16 NOTE — PROGRESS NOTES
Mohs Surgery Suture Removal Note   Patient Name: Giovanny Chaudhari Jr.  Patient : 1940  Date of Service: 2023    CC: Pt. Presents for removal of sutures on the right cheek  today  Subjective: patient reports wound healing as expected     Objective: Wound well healed, sutures clean/dry/intact. No evidence of any complications noted.     Assessment and Plan:  There are no diagnoses linked to this encounter.  - Suture removal today  - Steri strips/mastisol were not applied  - Patient counseled on silicone gel/silicone sheeting and their use in the management of post-operative scars  - Handout on silicone gel/silicone gel sheeting was provided  - Patient to follow up with their referring provider in 3 months for further skin evaluation    Ana chatman

## 2023-08-17 ENCOUNTER — OFFICE VISIT (OUTPATIENT)
Dept: FAMILY MEDICINE | Facility: CLINIC | Age: 83
End: 2023-08-17
Payer: MEDICARE

## 2023-08-17 VITALS
BODY MASS INDEX: 23.69 KG/M2 | SYSTOLIC BLOOD PRESSURE: 130 MMHG | WEIGHT: 151.25 LBS | HEART RATE: 73 BPM | DIASTOLIC BLOOD PRESSURE: 74 MMHG | OXYGEN SATURATION: 98 %

## 2023-08-17 DIAGNOSIS — R26.2 DIFFICULTY WALKING: ICD-10-CM

## 2023-08-17 DIAGNOSIS — E78.49 OTHER HYPERLIPIDEMIA: ICD-10-CM

## 2023-08-17 DIAGNOSIS — I69.354 HEMIPLEGIA AND HEMIPARESIS FOLLOWING CEREBRAL INFARCTION AFFECTING LEFT NON-DOMINANT SIDE: ICD-10-CM

## 2023-08-17 DIAGNOSIS — I10 ESSENTIAL HYPERTENSION: Primary | ICD-10-CM

## 2023-08-17 PROCEDURE — 99214 OFFICE O/P EST MOD 30 MIN: CPT | Mod: S$PBB,,, | Performed by: INTERNAL MEDICINE

## 2023-08-17 PROCEDURE — 99213 OFFICE O/P EST LOW 20 MIN: CPT | Mod: PBBFAC,PO | Performed by: INTERNAL MEDICINE

## 2023-08-17 PROCEDURE — 99999 PR PBB SHADOW E&M-EST. PATIENT-LVL III: ICD-10-PCS | Mod: PBBFAC,,, | Performed by: INTERNAL MEDICINE

## 2023-08-17 PROCEDURE — 99999 PR PBB SHADOW E&M-EST. PATIENT-LVL III: CPT | Mod: PBBFAC,,, | Performed by: INTERNAL MEDICINE

## 2023-08-17 PROCEDURE — 99214 PR OFFICE/OUTPT VISIT, EST, LEVL IV, 30-39 MIN: ICD-10-PCS | Mod: S$PBB,,, | Performed by: INTERNAL MEDICINE

## 2023-08-17 NOTE — PROGRESS NOTES
[facility]  Primary Care   [address] [facility]       Patient ID: Giovanny Chaudhari Jr.     Chief Complaint:   Chief Complaint   Patient presents with    Follow-up        HPI: Routine Follow up and since our last office visit he's had 1 skin cancer removed and will have a 2nd. Site looks like it's healing well.   Labs from 3/2023 showed a mild and stable anemia.   Hypertension Controlled with med, but I want him to keep cool and drink a good amount of water since it's so hot and he likes to work outside. IF he feels dizzy, I want him to stop the Losartan and drink more water.     Review of Systems       Negative     Objective:      Physical Exam   Physical Exam       Normal    Vitals:   Vitals:    08/17/23 0930   BP: 130/74   Pulse: 73   SpO2: 98%   Weight: 68.6 kg (151 lb 3.8 oz)        Assessment:           Plan:       Giovanny Chaudhari Jr.  was seen today for follow-up and may need lab work.    Diagnoses and all orders for this visit:    Giovanny was seen today for follow-up.    Diagnoses and all orders for this visit:    Essential hypertension  Controlled with med   Keep hydrated    Hemiplegia and hemiparesis following cerebral infarction affecting left non-dominant side  Stable    Other hyperlipidemia  Controlled with med     Difficulty walking  Stable          Rodrigue Luo MD

## 2023-08-30 DIAGNOSIS — C44.319 BASAL CELL CARCINOMA (BCC) OF LEFT TEMPLE REGION: Primary | ICD-10-CM

## 2023-09-13 ENCOUNTER — PROCEDURE VISIT (OUTPATIENT)
Dept: DERMATOLOGY | Facility: CLINIC | Age: 83
End: 2023-09-13
Payer: MEDICARE

## 2023-09-13 VITALS
HEIGHT: 67 IN | HEART RATE: 84 BPM | WEIGHT: 155 LBS | DIASTOLIC BLOOD PRESSURE: 80 MMHG | SYSTOLIC BLOOD PRESSURE: 154 MMHG | BODY MASS INDEX: 24.33 KG/M2

## 2023-09-13 DIAGNOSIS — C44.319 BASAL CELL CARCINOMA (BCC) OF SKIN OF OTHER PART OF FACE: ICD-10-CM

## 2023-09-13 PROCEDURE — 14301 TIS TRNFR ANY 30.1-60 SQ CM: CPT | Mod: PBBFAC,PO | Performed by: DERMATOLOGY

## 2023-09-13 PROCEDURE — 14301 TIS TRNFR ANY 30.1-60 SQ CM: CPT | Mod: S$PBB,51,, | Performed by: DERMATOLOGY

## 2023-09-13 PROCEDURE — 14301 PR ADJ TISS XFER ANY AREA,30.1-60 SQCM: ICD-10-PCS | Mod: S$PBB,51,, | Performed by: DERMATOLOGY

## 2023-09-13 PROCEDURE — 17311 MOHS 1 STAGE H/N/HF/G: CPT | Mod: PBBFAC,PO | Performed by: DERMATOLOGY

## 2023-09-13 PROCEDURE — 17311: ICD-10-PCS | Mod: S$PBB,,, | Performed by: DERMATOLOGY

## 2023-09-13 PROCEDURE — 17311 MOHS 1 STAGE H/N/HF/G: CPT | Mod: S$PBB,,, | Performed by: DERMATOLOGY

## 2023-09-13 PROCEDURE — 17312 MOHS ADDL STAGE: CPT | Mod: PBBFAC,PO | Performed by: DERMATOLOGY

## 2023-09-13 PROCEDURE — 17312: ICD-10-PCS | Mod: S$PBB,,, | Performed by: DERMATOLOGY

## 2023-09-13 PROCEDURE — 17312 MOHS ADDL STAGE: CPT | Mod: S$PBB,,, | Performed by: DERMATOLOGY

## 2023-09-13 NOTE — PROGRESS NOTES
MOHS MICROGRAPHIC SURGERY OPERATIVE NOTE  Name:  Giovanny Chaudhari Jr.  Date: 2023  Patient : 1940  Attending Surgeon: Uyen Mullins MD  Assistants: Ana Arevalo - Surgical Technician  Anesthetic Agent: 1% lidocaine with 1:100,000 epinephrine  Clinical Diagnosis: basal cell carcinoma nodular and infiltrative  Operation: Mohs Micrographic Surgery  Location: left temple  Indications: Location in mask areas of face including central face, nose, eyelids, eyebrows, lips, chin, preauricular, temple, and ear. Size > 1.0 cm on face. Tumor with aggressive histopathology.  Surgical Preparation: povidone-iodine     Description of Operation:  The nature and purpose of the procedure, associated risks and alternative treatments were explained to the patient in detail. All patient questions were answered completely. An informed operative consent and photography permit were obtained. The tumor location was then identified and marked with agreement by the patient of the correct location. The patient was positioned, prepped, and draped in the usual sterile manner. Local anesthesia was obtained with 4 cc(s) of 1% lidocaine with 1:100,000 epinephrine. The lesion pre-operatively measures 2.2 by 1.2 cm.     1ST STAGE:  A 2+ mm rim of normal appearing skin was marked circumferentially around the lesion after scraping with a curette to define the margin. The area thus outlined was excised at a 45 degree angle. Hemostasis was obtained with electrodesiccation. The specimen was oriented, mapped, and subdivided into at least two sections. Sections were then chromacoded and submitted for horizontal frozen sections. The patient tolerated the procedure well and there were no complications. Upon microscopic examination of the processed horizontal frozen sections of this stage:  Tumor was present at the margin of the specimen; these areas of residual tumor were marked on the reference map with red pencil, pinpointing the  location in which further tissue excision was necessary.  Tumor type noted on stage 1: Infiltrative basal cell carcinoma: Basaloid tumor in dermis characterized by thin elongated strands of basaloid cells infiltrating between dermal collagen bundles.     SUBSEQUENT STAGES:  The patient returned to the operating room for additional stages of tumor removal, and prepped in the manner described above. Surgery was directed to the areas having residual tumor, with thin layers of tissue being excised from these regions. A new reference map was prepared during the surgery to maintain precise orientation as described above. Hemostasis was achieved and the excised tissue was processed for microscopic analysis.  These sections were then examined by the Mohs surgeon, and areas of persistent tumor were indicated on the reference map. This process was repeated until the frozen horizontal sections of STAGE 3 revealed no further tumor cells and tumor eradication was considered to be  complete.  Tumor type noted on subsequent stages: No tumor seen. Missing epidermis on stage 2.     SUMMARY:  The tumor was extirpated in 3 Mohs Stages resulting in a final defect measuring 3.5 by 2.7 cm².   The final defect extended deep to subcutaneous fat  The patient tolerated the procedure well and no complications were noted.     PHOTOS:      Uyen Mullins MD    FLAP CLOSURE OF MOHS DEFECT OPERATIVE REPORT  Patient Name: Giovanny Chaudhari Jr.  Patient YOB: 1940  Date of procedure: 9/13/2023  Attending Surgeon: Uyen Mullins MD FAAD  Anesthetic Agent: 1% lidocaine with 1:100,000 epinephrine   Assistant: Ana Arevalo - Surgical Technician  Clinical Diagnosis: A 3.5 x 2.7 cm² defect after Mohs Micrographic Surgery  Location: left temple  Operation:  advancement Flap  Surgical Preparation: povidone-iodine    Description of Operation:  The nature and purpose of the procedure, associated risks and alternative treatments  were explained to the patient in detail. All patient questions were answered completely. In order to minimize tension on the closure and avoid compromising the anatomic contour of the anatomic region and maximize functional capacity, the above noted adjacent tissue transfer was performed.  An informed operative consent and photography permit were obtained. The patient was positioned, prepped, and draped in the usual sterile manner. Local anesthesia was obtained with 11 cc(s) of 1% lidocaine with 1:100,000 epinephrine The defect edges were debeveled with a #15 scalpel blade. Using gentian violet, the flap was designed adjacent to the defect including all anticipated dog ears. The area thus outlined was incised deep to adipose tissue with a #15 scalpel blade. This resulted in a final defect measuring 6.8 x 5.2 cm².   The flap and skin margins were then undermined 50 to 75% of the defects diameter in all directions utilizing supercut iris scissors. Hemostasis was achieved with electrodessication. The secondary defect was closed first utilizing 4-0 Vicryl interrupted buried subcutaneous sutures. The adjacent tissue flap was then mobilized into place and anchored with additional 4-0 Vicryl interrupted buried subcutaneous sutures. The flap and recipient sites were sculpted in the adipose and dermal planes for a good fit. The skin edges were then reapposed with 5-0 Nylon. Redundant tissue was noted at the inferior and superior aspect of the adjacent tissue transfer. Burows triangles were removed utilizing a #15 blade and the epidermal edges reapposed with 5-0 Nylon. This repair resulted in excellent contour with normal symmetry. The patient tolerated the procedure well and there were no complications.   Polysporin, Telfa and a pressure dressing were applied to sutures after gentle cleansing with saline.    Post op meds: None    Photos:        Uyen Mullins MD

## 2023-09-20 ENCOUNTER — CLINICAL SUPPORT (OUTPATIENT)
Dept: DERMATOLOGY | Facility: CLINIC | Age: 83
End: 2023-09-20
Payer: MEDICARE

## 2023-09-20 DIAGNOSIS — C44.319 BASAL CELL CARCINOMA (BCC) OF LEFT TEMPLE REGION: Primary | ICD-10-CM

## 2023-09-20 PROCEDURE — 99213 OFFICE O/P EST LOW 20 MIN: CPT | Mod: PBBFAC,PO

## 2023-09-20 PROCEDURE — 99999 PR PBB SHADOW E&M-EST. PATIENT-LVL III: ICD-10-PCS | Mod: PBBFAC,,,

## 2023-09-20 PROCEDURE — 99999 PR PBB SHADOW E&M-EST. PATIENT-LVL III: CPT | Mod: PBBFAC,,,

## 2023-09-20 NOTE — PROGRESS NOTES
Mohs Surgery Suture Removal Note   Patient Name: Giovanny Chaudhari Jr.  Patient : 1940  Date of Service: 2023    CC: Pt. Presents for removal of sutures on the basal cell carcinoma on the face today  Subjective: patient reports wound healing as expected     Objective: Wound well healed, sutures clean/dry/intact. No evidence of any complications noted.     Visit For Suture Removal:  - Suture removal today  - Steri strips/mastisol were not applied  - Patient counseled on silicone gel/silicone sheeting and their use in the management of post-operative scars  - Handout on silicone gel/silicone gel sheeting was provided  - Patient to follow up with their referring provider in 3 months for further skin evaluation    Ana Arevalo

## 2023-09-20 NOTE — PATIENT INSTRUCTIONS
Silicone Scar Treatment    Silicone gel sheeting is a simple and inexpensive treatment that has been clinically proven to aid in superficial wound healing following surgical procedures and can even improve the appearance of older scars.   The consistent use of silicone gel sheets helps to provide the best possible cosmetic outcome post-operatively.     How to use Silicone Gel Sheets:  Any brand is fine, generic drugstore brands should be equally effective. They are also available on [employer]  Cut the Silicone gel sheet to a size slightly larger than the scar and apply nightly to the affected area.   The sheets can also be worn during the daytime, if desired  Remove sheet in the AM and rinse it off in the sink, the sheets can be re-used for up to 3 weeks  Paper tape can be used to keep sheets in place while sleeping, if necessary  Sheeting should be used for at least 8 weeks for maximum benefit  Over the counter brands include ScarAway, [employer] brand, [facility] brand, etc.    Silicone Gels:  Silicone gels provide the benefits of silicone without the need for a visible bandage during the day  They dry quickly (within 5-10 minutes) and provide an invisible barrier that can last all day  Over the counter brands include Biocorneum, ScarAway, etc.

## 2023-10-30 DIAGNOSIS — E78.49 OTHER HYPERLIPIDEMIA: ICD-10-CM

## 2023-10-30 NOTE — TELEPHONE ENCOUNTER
Refill Routing Note   Medication(s) are not appropriate for processing by [facility] for the following reason(s):      Required labs outdated    [facility] action(s):  Defer Care Due:  None identified     Medication Therapy Plan: FLOS      Appointments  past 12m or future 3m with PCP    Date Provider   Last Visit   8/17/2023 Rodrigue Luo MD   Next Visit   2/21/2024 Rodrigue Luo MD   ED visits in past 90 days: 0        Note composed:2:07 PM 10/30/2023

## 2023-10-30 NOTE — TELEPHONE ENCOUNTER
Care Due:                  Date            Visit Type   Department     Provider  --------------------------------------------------------------------------------                                MYCHART                              FOLLOWUP/OF  [facility]  Last Visit: 08-      FICE VISIT   MEDICINE       Rodrigue Luo                              EP -                              PRIMARY      [facility]  Next Visit: 02-      CARE ([facility])   MEDICINE       Rodrigue Luo                                                            Last  Test          Frequency    Reason                     Performed    Due Date  --------------------------------------------------------------------------------    Lipid Panel.  12 months..  rosuvastatin.............  05-   05-    [facility] Embedded Care Due Messages. Reference number: [id number].   10/30/2023 10:50:26 AM CDT

## 2023-10-31 RX ORDER — ROSUVASTATIN CALCIUM 5 MG/1
5 TABLET, COATED ORAL
Qty: 90 TABLET | Refills: 1 | Status: SHIPPED | OUTPATIENT
Start: 2023-10-31 | End: 2024-02-21 | Stop reason: SDUPTHER

## 2023-12-03 DIAGNOSIS — Z71.89 COMPLEX CARE COORDINATION: ICD-10-CM

## 2024-01-11 DIAGNOSIS — Z00.00 ENCOUNTER FOR MEDICARE ANNUAL WELLNESS EXAM: ICD-10-CM

## 2024-02-14 ENCOUNTER — LAB VISIT (OUTPATIENT)
Dept: LAB | Facility: HOSPITAL | Age: 84
End: 2024-02-14
Attending: INTERNAL MEDICINE
Payer: MEDICARE

## 2024-02-14 DIAGNOSIS — E78.49 OTHER HYPERLIPIDEMIA: ICD-10-CM

## 2024-02-14 LAB
ALBUMIN SERPL BCP-MCNC: 4 G/DL (ref 3.5–5.2)
ALP SERPL-CCNC: 78 U/L (ref 55–135)
ALT SERPL W/O P-5'-P-CCNC: 15 U/L (ref 10–44)
ANION GAP SERPL CALC-SCNC: 8 MMOL/L (ref 8–16)
AST SERPL-CCNC: 23 U/L (ref 10–40)
BASOPHILS # BLD AUTO: 0.04 K/UL (ref 0–0.2)
BASOPHILS NFR BLD: 0.4 % (ref 0–1.9)
BILIRUB SERPL-MCNC: 0.6 MG/DL (ref 0.1–1)
BUN SERPL-MCNC: 19 MG/DL (ref 8–23)
CALCIUM SERPL-MCNC: 9.7 MG/DL (ref 8.7–10.5)
CHLORIDE SERPL-SCNC: 103 MMOL/L (ref 95–110)
CHOLEST SERPL-MCNC: 145 MG/DL (ref 120–199)
CHOLEST/HDLC SERPL: 2.5 {RATIO} (ref 2–5)
CO2 SERPL-SCNC: 27 MMOL/L (ref 23–29)
CREAT SERPL-MCNC: 1.3 MG/DL (ref 0.5–1.4)
DIFFERENTIAL METHOD BLD: NORMAL
EOSINOPHIL # BLD AUTO: 0.1 K/UL (ref 0–0.5)
EOSINOPHIL NFR BLD: 1.5 % (ref 0–8)
ERYTHROCYTE [DISTWIDTH] IN BLOOD BY AUTOMATED COUNT: 11.8 % (ref 11.5–14.5)
EST. GFR  (NO RACE VARIABLE): 53.8 ML/MIN/1.73 M^2
GLUCOSE SERPL-MCNC: 98 MG/DL (ref 70–110)
HCT VFR BLD AUTO: 43.2 % (ref 40–54)
HDLC SERPL-MCNC: 59 MG/DL (ref 40–75)
HDLC SERPL: 40.7 % (ref 20–50)
HGB BLD-MCNC: 14.3 G/DL (ref 14–18)
IMM GRANULOCYTES # BLD AUTO: 0 K/UL (ref 0–0.04)
IMM GRANULOCYTES NFR BLD AUTO: 0 % (ref 0–0.5)
LDLC SERPL CALC-MCNC: 69.4 MG/DL (ref 63–159)
LYMPHOCYTES # BLD AUTO: 4.3 K/UL (ref 1–4.8)
LYMPHOCYTES NFR BLD: 46.1 % (ref 18–48)
MCH RBC QN AUTO: 30.7 PG (ref 27–31)
MCHC RBC AUTO-ENTMCNC: 33.1 G/DL (ref 32–36)
MCV RBC AUTO: 93 FL (ref 82–98)
MONOCYTES # BLD AUTO: 1 K/UL (ref 0.3–1)
MONOCYTES NFR BLD: 10.6 % (ref 4–15)
NEUTROPHILS # BLD AUTO: 3.9 K/UL (ref 1.8–7.7)
NEUTROPHILS NFR BLD: 41.4 % (ref 38–73)
NONHDLC SERPL-MCNC: 86 MG/DL
NRBC BLD-RTO: 0 /100 WBC
PLATELET # BLD AUTO: 204 K/UL (ref 150–450)
PMV BLD AUTO: 9.5 FL (ref 9.2–12.9)
POTASSIUM SERPL-SCNC: 4.3 MMOL/L (ref 3.5–5.1)
PROT SERPL-MCNC: 7.4 G/DL (ref 6–8.4)
RBC # BLD AUTO: 4.66 M/UL (ref 4.6–6.2)
SODIUM SERPL-SCNC: 138 MMOL/L (ref 136–145)
TRIGL SERPL-MCNC: 83 MG/DL (ref 30–150)
WBC # BLD AUTO: 9.37 K/UL (ref 3.9–12.7)

## 2024-02-14 PROCEDURE — 85025 COMPLETE CBC W/AUTO DIFF WBC: CPT | Performed by: INTERNAL MEDICINE

## 2024-02-14 PROCEDURE — 80061 LIPID PANEL: CPT | Performed by: INTERNAL MEDICINE

## 2024-02-14 PROCEDURE — 36415 COLL VENOUS BLD VENIPUNCTURE: CPT | Mod: PO | Performed by: INTERNAL MEDICINE

## 2024-02-14 PROCEDURE — 80053 COMPREHEN METABOLIC PANEL: CPT | Performed by: INTERNAL MEDICINE

## 2024-02-21 ENCOUNTER — OFFICE VISIT (OUTPATIENT)
Dept: FAMILY MEDICINE | Facility: CLINIC | Age: 84
End: 2024-02-21
Payer: MEDICARE

## 2024-02-21 ENCOUNTER — OFFICE VISIT (OUTPATIENT)
Dept: DERMATOLOGY | Facility: CLINIC | Age: 84
End: 2024-02-21
Payer: MEDICARE

## 2024-02-21 VITALS
DIASTOLIC BLOOD PRESSURE: 76 MMHG | WEIGHT: 159.19 LBS | HEART RATE: 85 BPM | BODY MASS INDEX: 24.99 KG/M2 | HEIGHT: 67 IN | SYSTOLIC BLOOD PRESSURE: 138 MMHG | OXYGEN SATURATION: 96 %

## 2024-02-21 DIAGNOSIS — Z12.83 SCREENING FOR SKIN CANCER: ICD-10-CM

## 2024-02-21 DIAGNOSIS — I70.0 AORTIC ATHEROSCLEROSIS: ICD-10-CM

## 2024-02-21 DIAGNOSIS — E78.49 OTHER HYPERLIPIDEMIA: ICD-10-CM

## 2024-02-21 DIAGNOSIS — L82.1 SK (SEBORRHEIC KERATOSIS): ICD-10-CM

## 2024-02-21 DIAGNOSIS — I69.354 HEMIPLEGIA AND HEMIPARESIS FOLLOWING CEREBRAL INFARCTION AFFECTING LEFT NON-DOMINANT SIDE: ICD-10-CM

## 2024-02-21 DIAGNOSIS — L21.9 SEBORRHEIC DERMATITIS: Primary | ICD-10-CM

## 2024-02-21 DIAGNOSIS — R73.01 IMPAIRED FASTING GLUCOSE: Primary | ICD-10-CM

## 2024-02-21 DIAGNOSIS — L57.0 AK (ACTINIC KERATOSIS): ICD-10-CM

## 2024-02-21 DIAGNOSIS — D48.5 NEOPLASM OF UNCERTAIN BEHAVIOR OF SKIN: ICD-10-CM

## 2024-02-21 DIAGNOSIS — R26.2 DIFFICULTY WALKING: ICD-10-CM

## 2024-02-21 DIAGNOSIS — N18.31 CHRONIC KIDNEY DISEASE, STAGE 3A: ICD-10-CM

## 2024-02-21 DIAGNOSIS — Z85.828 HISTORY OF NONMELANOMA SKIN CANCER: ICD-10-CM

## 2024-02-21 DIAGNOSIS — I69.30 HISTORY OF ISCHEMIC CEREBROVASCULAR ACCIDENT (CVA) WITH RESIDUAL DEFICIT: ICD-10-CM

## 2024-02-21 DIAGNOSIS — I10 ESSENTIAL HYPERTENSION: ICD-10-CM

## 2024-02-21 DIAGNOSIS — I47.29 VENTRICULAR TACHYCARDIA (PAROXYSMAL): ICD-10-CM

## 2024-02-21 PROCEDURE — 99214 OFFICE O/P EST MOD 30 MIN: CPT | Mod: 25,S$PBB,, | Performed by: DERMATOLOGY

## 2024-02-21 PROCEDURE — 11102 TANGNTL BX SKIN SINGLE LES: CPT | Mod: S$PBB,,, | Performed by: DERMATOLOGY

## 2024-02-21 PROCEDURE — 99999 PR PBB SHADOW E&M-EST. PATIENT-LVL III: CPT | Mod: PBBFAC,,, | Performed by: INTERNAL MEDICINE

## 2024-02-21 PROCEDURE — 88305 TISSUE EXAM BY PATHOLOGIST: CPT | Mod: 26,,, | Performed by: DERMATOLOGY

## 2024-02-21 PROCEDURE — 99212 OFFICE O/P EST SF 10 MIN: CPT | Mod: PBBFAC,PO,25 | Performed by: DERMATOLOGY

## 2024-02-21 PROCEDURE — 99214 OFFICE O/P EST MOD 30 MIN: CPT | Mod: S$PBB,,, | Performed by: INTERNAL MEDICINE

## 2024-02-21 PROCEDURE — 17003 DESTRUCT PREMALG LES 2-14: CPT | Mod: PBBFAC,PO | Performed by: DERMATOLOGY

## 2024-02-21 PROCEDURE — 88305 TISSUE EXAM BY PATHOLOGIST: CPT | Mod: PO | Performed by: DERMATOLOGY

## 2024-02-21 PROCEDURE — 17003 DESTRUCT PREMALG LES 2-14: CPT | Mod: S$PBB,,, | Performed by: DERMATOLOGY

## 2024-02-21 PROCEDURE — 17000 DESTRUCT PREMALG LESION: CPT | Mod: S$PBB,XS,, | Performed by: DERMATOLOGY

## 2024-02-21 PROCEDURE — 99999 PR PBB SHADOW E&M-EST. PATIENT-LVL II: CPT | Mod: PBBFAC,,, | Performed by: DERMATOLOGY

## 2024-02-21 PROCEDURE — 17000 DESTRUCT PREMALG LESION: CPT | Mod: PBBFAC,PO | Performed by: DERMATOLOGY

## 2024-02-21 PROCEDURE — 99213 OFFICE O/P EST LOW 20 MIN: CPT | Mod: PBBFAC,25,27,PO | Performed by: INTERNAL MEDICINE

## 2024-02-21 PROCEDURE — 11102 TANGNTL BX SKIN SINGLE LES: CPT | Mod: 59,PBBFAC,PO | Performed by: DERMATOLOGY

## 2024-02-21 RX ORDER — LOSARTAN POTASSIUM 25 MG/1
25 TABLET ORAL DAILY
Qty: 90 TABLET | Refills: 3 | Status: SHIPPED | OUTPATIENT
Start: 2024-02-21 | End: 2024-05-06

## 2024-02-21 RX ORDER — ROSUVASTATIN CALCIUM 5 MG/1
5 TABLET, COATED ORAL NIGHTLY
Qty: 90 TABLET | Refills: 3 | Status: SHIPPED | OUTPATIENT
Start: 2024-02-21 | End: 2025-02-20

## 2024-02-21 RX ORDER — CLOPIDOGREL BISULFATE 75 MG/1
75 TABLET ORAL DAILY
Qty: 90 TABLET | Refills: 3 | Status: SHIPPED | OUTPATIENT
Start: 2024-02-21 | End: 2025-02-20

## 2024-02-21 NOTE — PROGRESS NOTES
Subjective:      Patient ID:  Giovanny Chaudhari Jr. is a 83 y.o. male who presents for   No chief complaint on file.    HPI  Established patient.  Here for f/u NMSC as below.   C/o bleeding spot in L ear and spot at R forehead.     +NMSC  BCC, infiltrative, at L temple s/p Mohs 9/2023  SCC-wd at R lower cheek s/p Mohs 8/2023      Past Medical History:   Diagnosis Date    Debility     History of ischemic cerebrovascular accident (CVA) with residual deficit     Hypertension     Other hyperlipidemia     Stroke     Ischemic CVA     Review of Systems   Constitutional:  Negative for fever, chills and fatigue.   HENT:  Negative for nosebleeds and congestion.    Respiratory:  Negative for cough and shortness of breath.    Gastrointestinal:  Negative for vomiting.   Skin:  Positive for rash. Negative for itching, sun sensitivity and daily sunscreen use.   Hematologic/Lymphatic: Bruises/bleeds easily.       Objective:   Physical Exam   Constitutional: He appears well-developed and well-nourished. No distress.   Neurological: He is alert and oriented to person, place, and time. He is not disoriented.   Psychiatric: He has a normal mood and affect.   Skin:   Areas Examined (abnormalities noted in diagram):   Scalp / Hair Palpated and Inspected  Head / Face Inspection Performed            Diagram Legend     Erythematous scaling macule/papule c/w actinic keratosis       Vascular papule c/w angioma      Pigmented verrucoid papule/plaque c/w seborrheic keratosis      Yellow umbilicated papule c/w sebaceous hyperplasia      Irregularly shaped tan macule c/w lentigo     1-2 mm smooth white papules consistent with Milia      Movable subcutaneous cyst with punctum c/w epidermal inclusion cyst      Subcutaneous movable cyst c/w pilar cyst      Firm pink to brown papule c/w dermatofibroma      Pedunculated fleshy papule(s) c/w skin tag(s)      Evenly pigmented macule c/w junctional nevus     Mildly variegated pigmented, slightly  irregular-bordered macule c/w mildly atypical nevus      Flesh colored to evenly pigmented papule c/w intradermal nevus       Pink pearly papule/plaque c/w basal cell carcinoma      Erythematous hyperkeratotic cursted plaque c/w SCC      Surgical scar with no sign of skin cancer recurrence      Open and closed comedones      Inflammatory papules and pustules      Verrucoid papule consistent consistent with wart     Erythematous eczematous patches and plaques     Dystrophic onycholytic nail with subungual debris c/w onychomycosis     Umbilicated papule    Erythematous-base heme-crusted tan verrucoid plaque consistent with inflamed seborrheic keratosis     Erythematous Silvery Scaling Plaque c/w Psoriasis     See annotation          Assessment / Plan:      Pathology Orders:       Normal Orders This Visit    Specimen to Pathology, Dermatology     Comments:    Number of Specimens:->1  ------------------------->-------------------------  Spec 1 Procedure:->Biopsy  Spec 1 Clinical Impression:->r/o malignancy  Spec 1 Source:->left antihelix  Release to patient->Immediate    Questions:    Procedure Type: Dermatology and skin neoplasms    Number of Specimens: 1    ------------------------: -------------------------    Spec 1 Procedure: Biopsy    Spec 1 Clinical Impression: r/o malignancy    Spec 1 Source: left antihelix    Release to patient: Immediate          Neoplasm of uncertain behavior of skin  -     Specimen to Pathology, Dermatology  - Discussed diagnosis with patient and explained uncertain nature of condition, including differential DDX.   - Discussed treatment options (biopsy, close monitoring) with patient, including the risks and benefits of each. Patient opted to pursue biopsy.  - Shave Biopsy Procedure Note: Discussed procedure with patient/patient's guardian including risks and benefits as well as treatment alternatives. Risks of procedure include pain, bleeding, infection, post-inflammatory pigmentary  alteration, scar, recurrence. Patient informed that the purpose of a biopsy is sampling of condition in question rather than removal in entirety; further treatment may be necessary. Verbal consent obtained. Area to be biopsied marked and cleansed with alcohol. Local anesthesia achieved by injecting approximately 1 cc of 1% lidocaine with epinephrine. One shave biopsy performed using a double edge razor blade; specimen submitted to pathology. Hemostasis achieved with aluminum chloride. Petroleum jelly and bandage applied to wound. Patient tolerated procedure well. After-visit wound care instructions reviewed and provided in writing.     AK (actinic keratosis)  - Discussed diagnosis, etiology, and precancerous nature of condition.   - Cryosurgery Procedure Note: Discussed procedure with patient/patient's guardian including risks and benefits as well as treatment alternatives. Risks of procedure include pain, itching, swelling, redness, blistering, crusting, wound formation, post-inflammatory pigmentary alteration, scar, recurrence. Verbal consent obtained. LN2 cryosurgery performed to 5 lesion(s). Patient tolerated procedure well. After-visit wound care instructions reviewed and provided in writing.      SK (seborrheic keratosis)  - Benign; reassured treatment not necessary.      History of nonmelanoma skin cancer  Screening for skin cancer  - Head and neck skin examination performed today.  - Findings listed above.   - Sites of prior malignancy examined - no concern for recurrence today.    - Recommended routine self examination of skin.    - Recommended daily sun protection, including the use of OTC broad-spectrum sunscreen (SPF 30 or greater) and sun-protective clothing.      Seborrheic dermatitis  -     ketoconazole (NIZORAL) 2 % shampoo; Wash scalp, in/around ears, brows and beard area with medicated shampoo at least 2-3x/week. Let sit at least 5 minutes prior to rinsing.  Dispense: 120 mL; Refill: 5  - Discussed  diagnosis, etiology, and treatment options.  - Counseled on potential SE of medication(s) and instructed on use.            No follow-ups on file.

## 2024-02-21 NOTE — PROGRESS NOTES
"[facility]  Primary [facility]   [address]       Patient ID: Giovanny Chaudhari Jr.     Chief Complaint:   Chief Complaint   Patient presents with    Follow-up     6 month           History of Present Illness  The patient is here for an annual visit.    Overall, he is doing very well. Since our last office visit, he has had a second skin cancer removed and that has healed. He does have an appointment with dermatology later today. I did take the time to review his labs, which generally all looked very good except I do think he could be a bit dehydrated. He admits to drinking mainly coffee all day, so I would like him to start drinking more water with electrolytes in the form of some Gatorade or Powerade because the summer is rapidly approaching and it does get very hot.    Review of Systems       Left sided weakness    Objective:      Physical Exam   Physical Exam       Physical Exam  Mild Left sided weakness     Results      Vitals:   Vitals:    02/21/24 0832   BP: 138/76   Pulse: 85   SpO2: 96%   Weight: 72.2 kg (159 lb 2.8 oz)   Height: 5' 7" (1.702 m)        Assessment:           Plan:       Giovanny Chaudhari Jr.  was seen today for follow-up and may need lab work.    Diagnoses and all orders for this visit:    Giovanny was seen today for follow-up.    Diagnoses and all orders for this visit:    Impaired fasting glucose    History of ischemic cerebrovascular accident (CVA) with residual deficit  -     clopidogreL (PLAVIX) 75 mg tablet; Take 1 tablet (75 mg total) by mouth once daily.    Essential hypertension  -     losartan (COZAAR) 25 MG tablet; Take 1 tablet (25 mg total) by mouth once daily.    Other hyperlipidemia  -     rosuvastatin (CRESTOR) 5 MG tablet; Take 1 tablet (5 mg total) by mouth every evening.    Difficulty walking    Ventricular tachycardia (paroxysmal)    Hemiplegia and hemiparesis following cerebral infarction affecting left non-dominant side    Chronic kidney " disease, stage 3a    Aortic atherosclerosis         Assessment & Plan  1. Wellness exam.  We discussed the utility of the current RSV vaccine and I will allow him to get that from his local pharmacy if he desires. Also, he is up to date on his pneumonia shot series, but there is a newer one on the market that he may be eligible for later in the year.    2. Essential hypertension.  This is controlled with losartan 25 mg per day.    3. Other hyperlipidemia.  It is very well controlled with rosuvastatin 5 mg at night.    4. History of a cerebrovascular accident.  This is stable and being well medicated with a combination of Plavix 75 mg a day and the aforementioned rosuvastatin.    5. Chronic kidney disease, 3a.  This is stable. I do want him to maintain adequate hydration.    6. Anemia.  This is actually corrected on most recent labs, but I think that is because of a decreased plasma volume from some mild dehydration.    7. Impaired fasting glucose.  We have decided to stop checking an A1c because he has never been a diabetic.    8. Difficulty walking.  He does take care when ambulating, has not had any falls.    9. History of paroxysmal ventricular tachycardia.  This has not been an issue.    Rodrigue Luo MD    This note was generated with the assistance of ambient listening technology. Verbal consent was obtained by the patient and accompanying visitor(s) for the recording of patient appointment to facilitate this note. I attest to having reviewed and edited the generated note for accuracy, though some syntax or spelling errors may persist. Please contact the author of this note for any clarification.

## 2024-02-22 ENCOUNTER — TELEPHONE (OUTPATIENT)
Dept: DERMATOLOGY | Facility: CLINIC | Age: 84
End: 2024-02-22
Payer: MEDICARE

## 2024-02-22 RX ORDER — KETOCONAZOLE 20 MG/ML
SHAMPOO, SUSPENSION TOPICAL
Qty: 120 ML | Refills: 5 | Status: SHIPPED | OUTPATIENT
Start: 2024-02-22

## 2024-02-22 NOTE — TELEPHONE ENCOUNTER
----- Message from Melanie Gillespie sent at 2/22/2024  9:25 AM CST -----  Regarding: Needs return call  Type: Needs Medical Advice  Who Called:  kena Aragon    Pharmacy name and phone #:    [facility] [address]  Phone: [phone] Fax: [phone]    Best Call Back Number: [phone]    Additional Information: Pt contacted [facility] stating that they never got a script for the visit on 2/21 please call to jony

## 2024-02-22 NOTE — TELEPHONE ENCOUNTER
----- Message from Kari Langston MD sent at 2/22/2024 12:39 PM CST -----  Regarding: RE: Needs return call  Done - sorry about that. It was ketoconazole shampoo. Thanks phyllis!    ----- Message -----  From: China Giles MA  Sent: 2/22/2024   9:40 AM CST  To: Kari Langston MD  Subject: FW: Needs return call                            Pt is asking about his prescription but I see you haven't closed his chart yet.  ----- Message -----  From: Melanie Gillespie  Sent: 2/22/2024   9:28 AM CST  To: Kian WEAVER Staff  Subject: Needs return call                                Type: Needs Medical Advice  Who Called:  kena Aragon    Pharmacy name and phone #:    [facility] [address]  Phone: [phone] Fax: [phone]    Best Call Back Number: [phone]    Additional Information: Pt contacted [facility] stating that they never got a script for the visit on 2/21 please call to jony

## 2024-02-22 NOTE — PATIENT INSTRUCTIONS
Shave Biopsy Wound Care    Your doctor has performed a shave biopsy today.  A band aid and vaseline ointment has been placed over the site.  This should remain in place for NO LONGER THAN 48 hours.  It is fine to remove the bandaid after 24 hours, if the area is no longer bleeding. It is recommended that you keep the area dry (do not wet)) for the first 24 hours.  After 24 hours, wash the area with warm soap and water and apply Vaseline jelly.  Many patients prefer to use Neosporin or Bacitracin ointment.  This is acceptable; however, know that you can develop an allergy to this medication even if you have used it safely for years.  It is important to keep the area moist.  Letting it dry out and get air slows healing time, and will worsen the scar.        If you notice increasing redness, tenderness, pain, or yellow drainage at the biopsy site, please notify your doctor.  These are signs of an infection.    If your biopsy site is bleeding, apply firm pressure for 15 minutes straight.  Repeat for another 15 minutes, if it is still bleeding.   If the surgical site continues to bleed, then please contact your doctor.      For [facility] users:   You will receive your biopsy results in [facility] as soon as they are available. Please be assured that your physician/provider will review your results and will then determine what further treatment, evaluation, or planning is required. You should be contacted by your physician's/provider's office within 5 business days of receiving your results; If not, please reach out to directly. This is one more way [facility] is putting you first.     [facility] [place] [address]/ [phone] FAX/ [email or web address]     CRYOSURGERY      Your doctor has used a method called cryosurgery to treat your skin condition. Cryosurgery refers to the use of very cold substances to treat a variety of skin conditions such as warts, pre-skin cancers, molluscum contagiosum,  sun spots, and several benign growths. The substance we use in cryosurgery is liquid nitrogen and is so cold (-195 degrees Celsius) that is burns when administered.     Following treatment in the office, the skin may immediately burn and become red. You may find the area around the lesion is affected as well. It is sometimes necessary to treat not only the lesion, but a small area of the surrounding normal skin to achieve a good response.     A blister, and even a blood filled blister, may form after treatment.   This is a normal response. If the blister is painful, it is acceptable to sterilize a needle and with rubbing alcohol and gently pop the blister. It is important that you gently wash the area with soap and warm water as the blister fluid may contain wart virus if a wart was treated. Do no remove the roof of the blister.     The area treated can take anywhere from 1-3 weeks to heal. Healing time depends on the kind skin lesion treated, the location, and how aggressively the lesion was treated. It is recommended that the areas treated are covered with Vaseline or bacitracin ointment and a band-aid. If a band-aid is not practical, just ointment applied several times per day will do. Keeping these areas moist will speed the healing time.    Treatment with liquid nitrogen can leave a scar. In dark skin, it may be a light or dark scar, in light skin it may be a white or pink scar. These will generally fade with time, but may never go away completely.     If you have any concerns after your treatment, please feel free to call the office.       [facility] [place] [address]/ [phone] FAX/ [email or web address]

## 2024-03-01 LAB
FINAL PATHOLOGIC DIAGNOSIS: NORMAL
GROSS: NORMAL
Lab: NORMAL
MICROSCOPIC EXAM: NORMAL

## 2024-03-01 NOTE — PROGRESS NOTES
Attempted patient via phone to discuss results, treatment options - topical vs Mohs. No answer. LVM indicating that I will re-attempt in near future.

## 2024-03-07 NOTE — PROGRESS NOTES
1. 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 The majority was removed with biopsy but recommend further treatment with fluorouracil cream BID x 6 weeks as tolerated (ok to stop after 4 weeks if very inflamed and painful).  Please  on protocol, SE, and precautions then pend order to correct pharmacy for me to sign. Please schedule 3 month f/u to re-evaluate site. Thank you!

## 2024-03-12 ENCOUNTER — TELEPHONE (OUTPATIENT)
Dept: DERMATOLOGY | Facility: CLINIC | Age: 84
End: 2024-03-12
Payer: MEDICARE

## 2024-03-12 DIAGNOSIS — D04.22 SQUAMOUS CELL CARCINOMA IN SITU (SCCIS) OF SKIN OF ANTIHELIX OF LEFT EAR: Primary | ICD-10-CM

## 2024-03-12 RX ORDER — FLUOROURACIL 50 MG/G
CREAM TOPICAL
Qty: 40 G | Refills: 1 | Status: SHIPPED | OUTPATIENT
Start: 2024-03-12 | End: 2024-03-22

## 2024-03-12 NOTE — TELEPHONE ENCOUNTER
----- Message from Kari Langston MD sent at 3/7/2024  1:51 PM CST -----  1. 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 The majority was removed with biopsy but recommend further treatment with fluorouracil cream BID x 6 weeks as tolerated (ok to stop after 4 weeks if very inflamed and painful).  Please  on protocol, SE, and precautions then pend order to correct pharmacy for me to sign. Please schedule 3 month f/u to re-evaluate site. Thank you!

## 2024-03-21 ENCOUNTER — PATIENT MESSAGE (OUTPATIENT)
Dept: DERMATOLOGY | Facility: CLINIC | Age: 84
End: 2024-03-21
Payer: MEDICARE

## 2024-03-22 RX ORDER — FLUOROURACIL 50 MG/G
CREAM TOPICAL
Qty: 40 G | Refills: 1 | Status: SHIPPED | OUTPATIENT
Start: 2024-03-22 | End: 2024-03-22

## 2024-03-22 RX ORDER — FLUOROURACIL 50 MG/G
CREAM TOPICAL
Qty: 40 G | Refills: 1 | Status: SHIPPED | OUTPATIENT
Start: 2024-03-22

## 2024-05-05 DIAGNOSIS — I10 ESSENTIAL HYPERTENSION: ICD-10-CM

## 2024-05-05 RX ORDER — LOSARTAN POTASSIUM 25 MG/1
25 TABLET ORAL DAILY
Qty: 90 TABLET | Refills: 3 | OUTPATIENT
Start: 2024-05-05 | End: 2025-05-05

## 2024-05-05 NOTE — TELEPHONE ENCOUNTER
No care due was identified.  [facility] Embedded Care Due Messages. Reference number: [id number].   5/05/2024 11:28:45 AM CDT

## 2024-05-05 NOTE — TELEPHONE ENCOUNTER
No care due was identified.  [facility] Embedded Care Due Messages. Reference number: [id number].   5/05/2024 12:00:21 PM CDT

## 2024-05-06 RX ORDER — LOSARTAN POTASSIUM 25 MG/1
25 TABLET ORAL
Qty: 90 TABLET | Refills: 3 | Status: SHIPPED | OUTPATIENT
Start: 2024-05-06

## 2024-05-06 NOTE — TELEPHONE ENCOUNTER
Refill Decision Note   Giovanny Chaudhari  is requesting a refill authorization.  Brief Assessment and Rationale for Refill:  Approve     Medication Therapy Plan:         Comments:     Note composed:3:05 AM 05/06/2024

## 2024-05-29 DIAGNOSIS — R42 VERTIGO: ICD-10-CM

## 2024-05-29 NOTE — TELEPHONE ENCOUNTER
No care due was identified.  [facility] Embedded Care Due Messages. Reference number: [id number].   5/29/2024 11:27:57 AM CDT

## 2024-05-30 ENCOUNTER — PATIENT MESSAGE (OUTPATIENT)
Dept: FAMILY MEDICINE | Facility: CLINIC | Age: 84
End: 2024-05-30
Payer: MEDICARE

## 2024-05-30 DIAGNOSIS — R42 VERTIGO: ICD-10-CM

## 2024-05-30 RX ORDER — MECLIZINE HYDROCHLORIDE 25 MG/1
25 TABLET ORAL 3 TIMES DAILY PRN
Qty: 30 TABLET | Refills: 0 | Status: SHIPPED | OUTPATIENT
Start: 2024-05-30 | End: 2024-05-31 | Stop reason: SDUPTHER

## 2024-05-30 NOTE — TELEPHONE ENCOUNTER
----- Message from Jeannine Hanna sent at 5/30/2024  3:43 PM CDT -----  Contact: self  Type:  Needs Medical Advice    Who Called: Pt Daughter Bobbi  Symptoms (please be specific):  Pt daughter checking the status of refill request / out of med (meclizine (ANTIVERT) 25 mg tablet)   How long has patient had these symptoms:  Pt has been out of meds since Friday  Pharmacy name and phone #:    [id number] - [facility] [id number] [place] [address] AT [facility] [address]  [address] [place] [id number]  Phone: [phone] Fax: [phone]  Would the patient rather a call back or a response via [facility]?  Call   Best Call Back Number:  Pt Daughter Bobbi [phone]  Please call to advise... Thank you...

## 2024-05-31 RX ORDER — MECLIZINE HYDROCHLORIDE 25 MG/1
25 TABLET ORAL 3 TIMES DAILY PRN
Qty: 30 TABLET | Refills: 0 | Status: SHIPPED | OUTPATIENT
Start: 2024-05-31 | End: 2024-06-14 | Stop reason: SDUPTHER

## 2024-05-31 NOTE — TELEPHONE ENCOUNTER
No care due was identified.  [facility] Embedded Care Due Messages. Reference number: [id number].   5/31/2024 9:30:45 AM CDT

## 2024-06-05 ENCOUNTER — OFFICE VISIT (OUTPATIENT)
Dept: DERMATOLOGY | Facility: CLINIC | Age: 84
End: 2024-06-05
Payer: MEDICARE

## 2024-06-05 DIAGNOSIS — L82.1 SK (SEBORRHEIC KERATOSIS): Primary | ICD-10-CM

## 2024-06-05 DIAGNOSIS — L57.0 AK (ACTINIC KERATOSIS): ICD-10-CM

## 2024-06-05 DIAGNOSIS — L91.8 ACROCHORDON: ICD-10-CM

## 2024-06-05 DIAGNOSIS — L73.8 SEBACEOUS HYPERPLASIA: ICD-10-CM

## 2024-06-05 DIAGNOSIS — D22.9 BENIGN NEVUS OF SKIN: ICD-10-CM

## 2024-06-05 DIAGNOSIS — Z12.83 SCREENING FOR SKIN CANCER: ICD-10-CM

## 2024-06-05 DIAGNOSIS — D18.01 CHERRY ANGIOMA: ICD-10-CM

## 2024-06-05 DIAGNOSIS — Z85.828 HISTORY OF NONMELANOMA SKIN CANCER: ICD-10-CM

## 2024-06-05 DIAGNOSIS — D48.5 NEOPLASM OF UNCERTAIN BEHAVIOR OF SKIN: ICD-10-CM

## 2024-06-05 PROCEDURE — 88305 TISSUE EXAM BY PATHOLOGIST: CPT | Mod: 59,PO | Performed by: PATHOLOGY

## 2024-06-05 PROCEDURE — 17004 DESTROY PREMAL LESIONS 15/>: CPT | Mod: PBBFAC,PO | Performed by: DERMATOLOGY

## 2024-06-05 PROCEDURE — 11103 TANGNTL BX SKIN EA SEP/ADDL: CPT | Mod: PBBFAC,PO | Performed by: DERMATOLOGY

## 2024-06-05 PROCEDURE — 88305 TISSUE EXAM BY PATHOLOGIST: CPT | Mod: 26,,, | Performed by: PATHOLOGY

## 2024-06-05 PROCEDURE — 99999 PR PBB SHADOW E&M-EST. PATIENT-LVL III: CPT | Mod: PBBFAC,,, | Performed by: DERMATOLOGY

## 2024-06-05 PROCEDURE — 17004 DESTROY PREMAL LESIONS 15/>: CPT | Mod: S$PBB,,, | Performed by: DERMATOLOGY

## 2024-06-05 PROCEDURE — 11102 TANGNTL BX SKIN SINGLE LES: CPT | Mod: S$PBB,XS,, | Performed by: DERMATOLOGY

## 2024-06-05 PROCEDURE — 99213 OFFICE O/P EST LOW 20 MIN: CPT | Mod: 25,S$PBB,, | Performed by: DERMATOLOGY

## 2024-06-05 PROCEDURE — 99213 OFFICE O/P EST LOW 20 MIN: CPT | Mod: PBBFAC,PO | Performed by: DERMATOLOGY

## 2024-06-05 PROCEDURE — 11103 TANGNTL BX SKIN EA SEP/ADDL: CPT | Mod: S$PBB,,, | Performed by: DERMATOLOGY

## 2024-06-05 PROCEDURE — 11102 TANGNTL BX SKIN SINGLE LES: CPT | Mod: 59,PBBFAC,PO | Performed by: DERMATOLOGY

## 2024-06-05 NOTE — PATIENT INSTRUCTIONS
Shave Biopsy Wound Care    Your doctor has performed a shave biopsy today.  A band aid and vaseline ointment has been placed over the site.  This should remain in place for NO LONGER THAN 48 hours.  It is fine to remove the bandaid after 24 hours, if the area is no longer bleeding. It is recommended that you keep the area dry (do not wet)) for the first 24 hours.  After 24 hours, wash the area with warm soap and water and apply Vaseline jelly.  Many patients prefer to use Neosporin or Bacitracin ointment.  This is acceptable; however, know that you can develop an allergy to this medication even if you have used it safely for years.  It is important to keep the area moist.  Letting it dry out and get air slows healing time, and will worsen the scar.        If you notice increasing redness, tenderness, pain, or yellow drainage at the biopsy site, please notify your doctor.  These are signs of an infection.    If your biopsy site is bleeding, apply firm pressure for 15 minutes straight.  Repeat for another 15 minutes, if it is still bleeding.   If the surgical site continues to bleed, then please contact your doctor.      For [facility] users:   You will receive your biopsy results in [facility] as soon as they are available. Please be assured that your physician/provider will review your results and will then determine what further treatment, evaluation, or planning is required. You should be contacted by your physician's/provider's office within 5 business days of receiving your results; If not, please reach out to directly. This is one more way [facility] is putting you first.     [facility] [place] [address]/ [phone] FAX/ [email or web address]     CRYOSURGERY      Your doctor has used a method called cryosurgery to treat your skin condition. Cryosurgery refers to the use of very cold substances to treat a variety of skin conditions such as warts, pre-skin cancers, molluscum contagiosum,  sun spots, and several benign growths. The substance we use in cryosurgery is liquid nitrogen and is so cold (-195 degrees Celsius) that is burns when administered.     Following treatment in the office, the skin may immediately burn and become red. You may find the area around the lesion is affected as well. It is sometimes necessary to treat not only the lesion, but a small area of the surrounding normal skin to achieve a good response.     A blister, and even a blood filled blister, may form after treatment.   This is a normal response. If the blister is painful, it is acceptable to sterilize a needle and with rubbing alcohol and gently pop the blister. It is important that you gently wash the area with soap and warm water as the blister fluid may contain wart virus if a wart was treated. Do no remove the roof of the blister.     The area treated can take anywhere from 1-3 weeks to heal. Healing time depends on the kind skin lesion treated, the location, and how aggressively the lesion was treated. It is recommended that the areas treated are covered with Vaseline or bacitracin ointment and a band-aid. If a band-aid is not practical, just ointment applied several times per day will do. Keeping these areas moist will speed the healing time.    Treatment with liquid nitrogen can leave a scar. In dark skin, it may be a light or dark scar, in light skin it may be a white or pink scar. These will generally fade with time, but may never go away completely.     If you have any concerns after your treatment, please feel free to call the office.       [facility] [place] [address]/ [phone] FAX/ [email or web address] What Are the Symptoms of Skin Cancer?  A change in your skin is the most common sign of skin cancer. This could be a new growth, a sore that doesnt heal, or a change in a mole. Not all skin cancers look the same.    For melanoma specifically, a simple way to remember the warning  signs is to remember the A-B-C-D-Es of melanoma--    A stands for asymmetrical. Does the mole or spot have an irregular shape with two parts that look very different?  B stands for border. Is the border irregular or jagged?  C is for color. Is the color uneven?  D is for diameter. Is the mole or spot larger than the size of a pea?  E is for evolving. Has the mole or spot changed during the past few weeks or months?    Talk to your doctor if you notice changes in your skin such as a new growth, a sore that doesnt heal, a change in an old growth, or any of the A-B-C-D-Es of melanoma    What Can I Do to Reduce My Risk of Skin Cancer?  Protection from ultraviolet (UV) radiation is important all year, not just during the summer or at the beach. UV rays from the sun can reach you on cloudy and hazy days, not just on bright and philippe days. UV rays also reflect off of surfaces like water, cement, sand, and snow. Indoor tanning (using a tanning bed, llamas, or sunlamp to get tan) exposes users to UV radiation.    The hours between 10 a.m. and 4 p.m. Daylight Saving Time (9 a.m. to 3 p.m. standard time) are the most hazardous for UV exposure outdoors in the continental [place]. UV rays from sunlight are the greatest during the late spring and early summer in [place].    CDC recommends easy options for protection from UV radiation--    Stay in the shade or indoors, especially during midday hours.  Wear clothing that covers your arms and legs.  Wear a hat with a wide brim to shade your face, head, ears, and neck.  Wear sunglasses that wrap around and block both UVA and UVB rays.  Use sunscreen with a sun protection factor (SPF) of 30 or higher, and both UVA and UVB (broad spectrum) protection.  Avoid indoor tanning.    Adapted from https://www.cdc.gov/cancer/skin/basic_info/

## 2024-06-05 NOTE — PROGRESS NOTES
Subjective:      Patient ID:  Giovanny Chaudhari Jr. is a 84 y.o. male who presents for   No chief complaint on file.    HPI  Established patient.  Here today for upper body skin exam.    Hx of NMSC, AK. Recent bx of SCCIS at L ear, subsequent Efudex with good results.   C/o spots at forearms, hands. Using keto shampoo at scalp, ears, face with significant improvement.     2/2024  1. 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   HENT:  Negative for nosebleeds and congestion.    Respiratory:  Negative for cough and shortness of breath.    Gastrointestinal:  Negative for vomiting.   Skin:  Positive for rash. Negative for itching, sun sensitivity and daily sunscreen use.   Hematologic/Lymphatic: Bruises/bleeds easily.       Objective:   Physical Exam   Constitutional: He appears well-developed and well-nourished. No distress.   Neurological: He is alert and oriented to person, place, and time. He is not disoriented.   Psychiatric: He has a normal mood and affect.   Skin:   Areas Examined (abnormalities noted in diagram):   Scalp / Hair Palpated and Inspected  Head / Face Inspection Performed  Neck Inspection Performed  Chest / Axilla Inspection Performed  Abdomen Inspection Performed  Back Inspection Performed                 Diagram Legend     Erythematous scaling macule/papule c/w actinic keratosis       Vascular papule c/w angioma      Pigmented verrucoid papule/plaque c/w seborrheic  keratosis      Yellow umbilicated papule c/w sebaceous hyperplasia      Irregularly shaped tan macule c/w lentigo     1-2 mm smooth white papules consistent with Milia      Movable subcutaneous cyst with punctum c/w epidermal inclusion cyst      Subcutaneous movable cyst c/w pilar cyst      Firm pink to brown papule c/w dermatofibroma      Pedunculated fleshy papule(s) c/w skin tag(s)      Evenly pigmented macule c/w junctional nevus     Mildly variegated pigmented, slightly irregular-bordered macule c/w mildly atypical nevus      Flesh colored to evenly pigmented papule c/w intradermal nevus       Pink pearly papule/plaque c/w basal cell carcinoma      Erythematous hyperkeratotic cursted plaque c/w SCC      Surgical scar with no sign of skin cancer recurrence      Open and closed comedones      Inflammatory papules and pustules      Verrucoid papule consistent consistent with wart     Erythematous eczematous patches and plaques     Dystrophic onycholytic nail with subungual debris c/w onychomycosis     Umbilicated papule    Erythematous-base heme-crusted tan verrucoid plaque consistent with inflamed seborrheic keratosis     Erythematous Silvery Scaling Plaque c/w Psoriasis     See annotation                      Assessment / Plan:      Pathology Orders:       Normal Orders This Visit    Specimen to Pathology, Dermatology     Questions:    Procedure Type: Dermatology and skin neoplasms    Number of Specimens: 4    ------------------------: -------------------------    Spec 1 Procedure: Biopsy    Spec 1 Clinical Impression: r/o bcc vs ak vs inflammatory    Spec 1 Source: right vertex    ------------------------: -------------------------    Spec 2 Procedure: Biopsy    Spec 2 Clinical Impression: r/o scc vs isk    Spec 2 Source: right forehead    ------------------------: -------------------------    Spec 3 Procedure: Biopsy    Spec 3 Clinical Impression: idn vs other r/o malignancy    Spec 3 Source: right mid chest     ------------------------: -------------------------    Spec 4 Procedure: Biopsy    Spec 4 Clinical Impression: r/o scc vs isk    Spec 4 Source: right scapula    Release to patient:             Neoplasm of uncertain behavior of skin  -     Specimen to Pathology, Dermatology  - Discussed diagnosis with patient and explained uncertain nature of condition, including ddx.   - Discussed treatment options (biopsy, close monitoring) with patient, including the risks and benefits of each. Patient opted to pursue biopsy.  - Shave Biopsy Procedure Note: Discussed procedure with patient/patient's guardian including risks and benefits as well as treatment alternatives. Risks of procedure include pain, bleeding, infection, post-inflammatory pigmentary alteration, scar, recurrence. Patient informed that the purpose of a biopsy is sampling of condition in question rather than removal in entirety; further treatment may be necessary. Verbal consent obtained. Area to be biopsied marked and cleansed with alcohol. Local anesthesia achieved by injecting approximately 1 cc of 1% lidocaine with epinephrine. 4 shave biopsies performed using a double edge razor blade; specimens submitted to pathology. Hemostasis achieved with aluminum chloride. Petroleum jelly and bandage applied to wounds. Patient tolerated procedures well. After-visit wound care instructions reviewed and provided in writing.      AK (actinic keratosis)  - Discussed diagnosis, etiology, and precancerous nature of condition.   - Cryosurgery Procedure Note: Discussed procedure with patient/patient's guardian including risks and benefits as well as treatment alternatives. Risks of procedure include pain, itching, swelling, redness, blistering, crusting, wound formation, post-inflammatory pigmentary alteration, scar, recurrence. Verbal consent obtained. LN2 cryosurgery performed to 15 lesion(s). Patient tolerated procedure well. After-visit wound care instructions reviewed  and provided in writing.      SK (seborrheic keratosis)  Acrochordon  Benign nevus of skin  Cherry angioma  Sebaceous hyperplasia   - Benign; reassured treatment not necessary.      History of nonmelanoma skin cancer  Screening for skin cancer  - Upper body skin examination performed today.  - Findings listed above.   - Sites of prior malignancy examined - no concern for recurrence today.    - Recommended routine self examination of skin.    - Recommended daily sun protection, including the use of OTC broad-spectrum sunscreen (SPF 30 or greater) and sun-protective clothing.      Seborrheic dermatitis  C/w ketoconazole shampoo to scalp, ears, face at least 2-3x weekly.            Follow up for pending pathology.

## 2024-06-10 LAB
FINAL PATHOLOGIC DIAGNOSIS: NORMAL
GROSS: NORMAL
Lab: NORMAL
MICROSCOPIC EXAM: NORMAL

## 2024-06-11 ENCOUNTER — TELEPHONE (OUTPATIENT)
Dept: DERMATOLOGY | Facility: CLINIC | Age: 84
End: 2024-06-11
Payer: MEDICARE

## 2024-06-11 NOTE — TELEPHONE ENCOUNTER
----- Message from Kari Langston MD sent at 6/11/2024  5:11 AM CDT -----  1. Skin, right vertex, shave biopsy:  -ACTINIC KERATOSIS, INVOLVING HAIR FOLLICLES  2. Skin, right forehead, shave biopsy:  -GRANULOMATOUS INFLAMMATION, see comment  COMMENT:  The histological findings are non-specific but suggestive of a previously ruptured cyst or hair follicle.  Clinical correlation is recommended.  3.  Skin, right mid chest, shave biopsy:  -MELANOCYTIC NEVUS, INTRADERMAL TYPE  4. Skin, right scapula, shave biopsy:  -SEBORRHEIC KERATOSIS, IRRITATED AND INFLAMED    Please call to discuss results / plan / schedule:   1) precancerous lesion, likely treated with biopsy itself, no further treatment necessary.   2-4) benign, no further treatment necessary.   Please schedule 6mo f/u skin check. Thank you

## 2024-06-14 ENCOUNTER — PATIENT MESSAGE (OUTPATIENT)
Dept: FAMILY MEDICINE | Facility: CLINIC | Age: 84
End: 2024-06-14
Payer: MEDICARE

## 2024-06-14 DIAGNOSIS — R42 VERTIGO: ICD-10-CM

## 2024-06-14 RX ORDER — MECLIZINE HYDROCHLORIDE 25 MG/1
25 TABLET ORAL 3 TIMES DAILY PRN
Qty: 30 TABLET | Refills: 0 | OUTPATIENT
Start: 2024-06-14 | End: 2024-06-24

## 2024-06-14 NOTE — TELEPHONE ENCOUNTER
No care due was identified.  [facility] Embedded Care Due Messages. Reference number: [id number].   6/14/2024 6:58:50 AM CDT

## 2024-06-14 NOTE — TELEPHONE ENCOUNTER
Refill Routing Note   Medication(s) are not appropriate for processing by [facility] for the following reason(s):        Outside of protocol    [facility] action(s):  Route             Appointments  past 12m or future 3m with PCP    Date Provider   Last Visit   2/21/2024 Rodrigue Luo MD   Next Visit   7/26/2024 Rodrigue Luo MD   ED visits in past 90 days: 0        Note composed:7:23 AM 06/14/2024

## 2024-06-16 RX ORDER — MECLIZINE HYDROCHLORIDE 25 MG/1
25 TABLET ORAL 3 TIMES DAILY PRN
Qty: 30 TABLET | Refills: 0 | Status: SHIPPED | OUTPATIENT
Start: 2024-06-16 | End: 2024-06-26

## 2024-07-03 DIAGNOSIS — Z71.89 COMPLEX CARE COORDINATION: ICD-10-CM

## 2024-07-26 ENCOUNTER — OFFICE VISIT (OUTPATIENT)
Dept: FAMILY MEDICINE | Facility: CLINIC | Age: 84
End: 2024-07-26
Payer: MEDICARE

## 2024-07-26 VITALS
SYSTOLIC BLOOD PRESSURE: 112 MMHG | WEIGHT: 155.19 LBS | HEART RATE: 80 BPM | BODY MASS INDEX: 24.36 KG/M2 | HEIGHT: 67 IN | OXYGEN SATURATION: 98 % | DIASTOLIC BLOOD PRESSURE: 72 MMHG

## 2024-07-26 DIAGNOSIS — I69.30 HISTORY OF ISCHEMIC CEREBROVASCULAR ACCIDENT (CVA) WITH RESIDUAL DEFICIT: ICD-10-CM

## 2024-07-26 DIAGNOSIS — E78.49 OTHER HYPERLIPIDEMIA: ICD-10-CM

## 2024-07-26 DIAGNOSIS — R42 VERTIGO: ICD-10-CM

## 2024-07-26 DIAGNOSIS — R53.1 LEFT-SIDED WEAKNESS: ICD-10-CM

## 2024-07-26 DIAGNOSIS — Z00.00 WELLNESS EXAMINATION: Primary | ICD-10-CM

## 2024-07-26 DIAGNOSIS — R26.2 DIFFICULTY WALKING: ICD-10-CM

## 2024-07-26 DIAGNOSIS — I10 ESSENTIAL HYPERTENSION: ICD-10-CM

## 2024-07-26 DIAGNOSIS — R11.0 NAUSEA: ICD-10-CM

## 2024-07-26 PROCEDURE — 99213 OFFICE O/P EST LOW 20 MIN: CPT | Mod: PBBFAC,PO | Performed by: INTERNAL MEDICINE

## 2024-07-26 PROCEDURE — 99999 PR PBB SHADOW E&M-EST. PATIENT-LVL III: CPT | Mod: PBBFAC,,, | Performed by: INTERNAL MEDICINE

## 2024-07-26 RX ORDER — ONDANSETRON 4 MG/1
4 TABLET, FILM COATED ORAL EVERY 6 HOURS PRN
Qty: 120 TABLET | Refills: 0 | Status: SHIPPED | OUTPATIENT
Start: 2024-07-26 | End: 2024-08-25

## 2024-07-26 RX ORDER — MECLIZINE HYDROCHLORIDE 25 MG/1
25 TABLET ORAL 3 TIMES DAILY PRN
Qty: 90 TABLET | Refills: 0 | Status: SHIPPED | OUTPATIENT
Start: 2024-07-26 | End: 2024-08-25

## 2024-07-26 NOTE — PROGRESS NOTES
"[facility]  Primary [facility]   [address]       Patient ID: Giovanny Chaudhari Jr.     Chief Complaint:   Chief Complaint   Patient presents with    Follow-up     6 month           HPI:  Annual exam.  I would like to say he is doing well, but he has had a few instances of vertigo with resulting falls and a few mechanical falls without vertigo over the past 6 months.  He did injure his scalp and Left elbow.  He seems to be healed up well.  He does note that his left leg can get weak in his choking catch on the floor this has been a residual from his stroke.  I really do think physical therapy to help restrained that leg and work on some balance would be in his best interest so I will order home health.  For the vertigo, he needs a larger supply of meclizine that I prescribed in the past so I did increase that.  I also gave him some Zofran for episodic nausea.  Labs back in February were were fairly good but I mentioned that I do want him to keep hydrated especially now since it is so hot.  He does live alone and I want to keep him there as long as I can so I do think keeping his balance is of the utmost importance.  We will have close follow-up to see how we are doing.    Review of Systems       Balance issues    Objective:      Physical Exam   Physical Exam       Left Lower Extremity weakness   Some Left hand tremors (chronic)      Vitals:   Vitals:    07/26/24 0939   BP: 112/72   Pulse: 80   SpO2: 98%   Weight: 70.4 kg (155 lb 3.3 oz)   Height: 5' 7" (1.702 m)        Assessment:           Plan:       Giovanny Chaudhari Jr.  was seen today for follow-up and may need lab work.    Diagnoses and all orders for this visit:    Giovanny Solano" was seen today for follow-up.    Diagnoses and all orders for this visit:    Wellness examination    Vertigo  -     meclizine (ANTIVERT) 25 mg tablet; Take 1 tablet (25 mg total) by mouth 3 (three) times daily as needed for Dizziness.  -     Ambulatory " referral/consult to Home Health; Future  Monitor on meds     Essential hypertension  Controlled without meds     Other hyperlipidemia  Controlled with Rosuvastatin     Difficulty walking  Needs Physical Therapy   He is not too compliant with a walker     Nausea  -     ondansetron (ZOFRAN) 4 MG tablet; Take 1 tablet (4 mg total) by mouth every 6 (six) hours as needed for Nausea.  Monitor on meds     History of ischemic cerebrovascular accident (CVA) with residual deficit  -     Ambulatory referral/consult to Home Health; Future    Left-sided weakness  Needs balance training     Visit today included increased complexity associated with the care of the episodic problem hypertension Left Lower Extremity weakness Falls Vertigo  addressed and managing the longitudinal care of the patient due to the serious and/or complex managed problem(s) .              Rodrigue Luo MD

## 2024-07-29 PROCEDURE — G0180 MD CERTIFICATION HHA PATIENT: HCPCS | Mod: ,,, | Performed by: INTERNAL MEDICINE

## 2024-08-15 ENCOUNTER — EXTERNAL HOME HEALTH (OUTPATIENT)
Dept: HOME HEALTH SERVICES | Facility: HOSPITAL | Age: 84
End: 2024-08-15
Payer: MEDICARE

## 2024-09-20 ENCOUNTER — PATIENT MESSAGE (OUTPATIENT)
Dept: FAMILY MEDICINE | Facility: CLINIC | Age: 84
End: 2024-09-20
Payer: MEDICARE

## 2024-09-20 DIAGNOSIS — Z12.5 PROSTATE CANCER SCREENING: ICD-10-CM

## 2024-09-20 DIAGNOSIS — I10 ESSENTIAL HYPERTENSION: Primary | ICD-10-CM

## 2024-09-20 DIAGNOSIS — N30.00 ACUTE CYSTITIS WITHOUT HEMATURIA: ICD-10-CM

## 2024-09-27 ENCOUNTER — OFFICE VISIT (OUTPATIENT)
Dept: FAMILY MEDICINE | Facility: CLINIC | Age: 84
End: 2024-09-27
Payer: MEDICARE

## 2024-09-27 VITALS
WEIGHT: 146.19 LBS | BODY MASS INDEX: 22.94 KG/M2 | HEART RATE: 89 BPM | SYSTOLIC BLOOD PRESSURE: 106 MMHG | HEIGHT: 67 IN | OXYGEN SATURATION: 96 % | DIASTOLIC BLOOD PRESSURE: 78 MMHG

## 2024-09-27 DIAGNOSIS — N18.32 STAGE 3B CHRONIC KIDNEY DISEASE: ICD-10-CM

## 2024-09-27 DIAGNOSIS — Z23 NEED FOR IMMUNIZATION AGAINST INFLUENZA: ICD-10-CM

## 2024-09-27 DIAGNOSIS — I10 ESSENTIAL HYPERTENSION: ICD-10-CM

## 2024-09-27 DIAGNOSIS — R97.20 ELEVATED PSA, LESS THAN 10 NG/ML: ICD-10-CM

## 2024-09-27 DIAGNOSIS — D64.9 ANEMIA, UNSPECIFIED TYPE: ICD-10-CM

## 2024-09-27 DIAGNOSIS — I69.354 HEMIPLEGIA AND HEMIPARESIS FOLLOWING CEREBRAL INFARCTION AFFECTING LEFT NON-DOMINANT SIDE: ICD-10-CM

## 2024-09-27 DIAGNOSIS — R35.1 NOCTURIA: Primary | ICD-10-CM

## 2024-09-27 PROCEDURE — 90653 IIV ADJUVANT VACCINE IM: CPT | Mod: PBBFAC,PO

## 2024-09-27 PROCEDURE — 99999PBSHW PR PBB SHADOW TECHNICAL ONLY FILED TO HB: Mod: PBBFAC,,,

## 2024-09-27 PROCEDURE — G2211 COMPLEX E/M VISIT ADD ON: HCPCS | Mod: S$PBB,,, | Performed by: INTERNAL MEDICINE

## 2024-09-27 PROCEDURE — 99999 PR PBB SHADOW E&M-EST. PATIENT-LVL III: CPT | Mod: PBBFAC,,, | Performed by: INTERNAL MEDICINE

## 2024-09-27 PROCEDURE — G0008 ADMIN INFLUENZA VIRUS VAC: HCPCS | Mod: PBBFAC,PO

## 2024-09-27 PROCEDURE — 99213 OFFICE O/P EST LOW 20 MIN: CPT | Mod: PBBFAC,PO | Performed by: INTERNAL MEDICINE

## 2024-09-27 PROCEDURE — 99214 OFFICE O/P EST MOD 30 MIN: CPT | Mod: S$PBB,,, | Performed by: INTERNAL MEDICINE

## 2024-09-27 RX ORDER — TAMSULOSIN HYDROCHLORIDE 0.4 MG/1
0.4 CAPSULE ORAL NIGHTLY
Qty: 30 CAPSULE | Refills: 11 | Status: SHIPPED | OUTPATIENT
Start: 2024-09-27 | End: 2025-09-27

## 2024-09-27 RX ADMIN — INFLUENZA A VIRUS A/VICTORIA/4897/2022 IVR-238 (H1N1) ANTIGEN (FORMALDEHYDE INACTIVATED), INFLUENZA A VIRUS A/THAILAND/8/2022 IVR-237 (H3N2) ANTIGEN (FORMALDEHYDE INACTIVATED), INFLUENZA B VIRUS B/AUSTRIA/1359417/2021 BVR-26 ANTIGEN (FORMALDEHYDE INACTIVATED) 0.5 ML: 15; 15; 15 INJECTION, SUSPENSION INTRAMUSCULAR at 10:09

## 2024-09-27 NOTE — PROGRESS NOTES
"[facility]  [facility]   [address] [facility]       Patient ID: Giovanny Chaudhari Jr.     Chief Complaint:   Chief Complaint   Patient presents with    Follow-up     2 month         HPI:  Routine follow-up.  He has been about 2 months since we last saw each other and in that time had a urinary tract infection which was probably a case of acute prostatitis.  He took 10 days of ciprofloxacin 500 mg twice daily and since then has had nocturia 8-10 times per night.  He is understandably tired during the day.  Urinalysis from 3 days ago looks good.  PSA is slightly elevated at 5 but I think that is due to residual prostate inflammation.  His creatinine is slightly elevated above his baseline but that fits with the post obstructive urinary outflow issue.  Blood pressure is on the low end.  I am going to stop his losartan 25 mg and start Flomax 0.4 mg at night.  I would like him to take extra care getting up out of bed to use the restroom for the 1st week so that his body can get used to it and I really do not want to overmedicate him to the point where he has orthostatic hypotension.  We will probably have short-term follow-up and repeat some labs to see how things are going and if things are still not right I will get an ultrasound of his collecting system.  He does agree to a flu shot today    Review of Systems       Nocturia     Objective:      Physical Exam   Physical Exam       Ambulates with cane     Vitals:   Vitals:    09/27/24 0934   BP: 106/78   Pulse: 89   SpO2: 96%   Weight: 66.3 kg (146 lb 2.6 oz)   Height: 5' 7" (1.702 m)        Assessment:           Plan:       Giovanny Chaudhari Jr.  was seen today for follow-up and may need lab work.    Diagnoses and all orders for this visit:    Giovanny Solano" was seen today for follow-up.    Diagnoses and all orders for this visit:    Nocturia  -     tamsulosin (FLOMAX) 0.4 mg Cap; Take 1 capsule (0.4 mg total) by mouth every evening.  Take " extra care getting out of bed     Need for immunization against influenza  -     influenza (adjuvanted) (Fluad) 45 mcg/0.5 mL IM vaccine (> or = 66 yo) 0.5 mL    Stage 3b chronic kidney disease  -     Comprehensive Metabolic Panel; Future  Monitor Labs    Hemiplegia and hemiparesis following cerebral infarction affecting left non-dominant side  Stable     Anemia, unspecified type  Monitor     Elevated PSA, less than 10 ng/ml  -     PROSTATE SPECIFIC ANTIGEN, DIAGNOSTIC; Future  Recheck labs later     Essential Hypertension - Stop Losartan to prevent overmedicating     Visit today included increased complexity associated with the care of the episodic problem Nocturia Chronic Kidney Disease 3 Anemia Elevated PSA hypertension  addressed and managing the longitudinal care of the patient due to the serious and/or complex managed problem(s) .           Rodrigue uLo MD

## 2024-10-11 ENCOUNTER — DOCUMENT SCAN (OUTPATIENT)
Dept: HOME HEALTH SERVICES | Facility: HOSPITAL | Age: 84
End: 2024-10-11
Payer: MEDICARE

## 2024-10-14 ENCOUNTER — DOCUMENT SCAN (OUTPATIENT)
Dept: HOME HEALTH SERVICES | Facility: HOSPITAL | Age: 84
End: 2024-10-14
Payer: MEDICARE

## 2024-11-07 ENCOUNTER — LAB VISIT (OUTPATIENT)
Dept: LAB | Facility: HOSPITAL | Age: 84
End: 2024-11-07
Attending: INTERNAL MEDICINE
Payer: MEDICARE

## 2024-11-07 DIAGNOSIS — N18.32 STAGE 3B CHRONIC KIDNEY DISEASE: ICD-10-CM

## 2024-11-07 DIAGNOSIS — R97.20 ELEVATED PSA, LESS THAN 10 NG/ML: ICD-10-CM

## 2024-11-07 LAB
ALBUMIN SERPL BCP-MCNC: 3.7 G/DL (ref 3.5–5.2)
ALP SERPL-CCNC: 74 U/L (ref 40–150)
ALT SERPL W/O P-5'-P-CCNC: 10 U/L (ref 10–44)
ANION GAP SERPL CALC-SCNC: 12 MMOL/L (ref 8–16)
AST SERPL-CCNC: 20 U/L (ref 10–40)
BILIRUB SERPL-MCNC: 0.7 MG/DL (ref 0.1–1)
BUN SERPL-MCNC: 21 MG/DL (ref 8–23)
CALCIUM SERPL-MCNC: 9.5 MG/DL (ref 8.7–10.5)
CHLORIDE SERPL-SCNC: 105 MMOL/L (ref 95–110)
CO2 SERPL-SCNC: 22 MMOL/L (ref 23–29)
COMPLEXED PSA SERPL-MCNC: 6.4 NG/ML (ref 0–4)
CREAT SERPL-MCNC: 1.6 MG/DL (ref 0.5–1.4)
EST. GFR  (NO RACE VARIABLE): 41.7 ML/MIN/1.73 M^2
GLUCOSE SERPL-MCNC: 115 MG/DL (ref 70–110)
POTASSIUM SERPL-SCNC: 3.5 MMOL/L (ref 3.5–5.1)
PROT SERPL-MCNC: 7.2 G/DL (ref 6–8.4)
SODIUM SERPL-SCNC: 139 MMOL/L (ref 136–145)

## 2024-11-07 PROCEDURE — 36415 COLL VENOUS BLD VENIPUNCTURE: CPT | Mod: PO | Performed by: INTERNAL MEDICINE

## 2024-11-07 PROCEDURE — 80053 COMPREHEN METABOLIC PANEL: CPT | Performed by: INTERNAL MEDICINE

## 2024-11-07 PROCEDURE — 84153 ASSAY OF PSA TOTAL: CPT | Performed by: INTERNAL MEDICINE

## 2024-11-11 ENCOUNTER — OFFICE VISIT (OUTPATIENT)
Dept: FAMILY MEDICINE | Facility: CLINIC | Age: 84
End: 2024-11-11
Payer: MEDICARE

## 2024-11-11 VITALS
BODY MASS INDEX: 23.22 KG/M2 | HEART RATE: 103 BPM | SYSTOLIC BLOOD PRESSURE: 132 MMHG | HEIGHT: 67 IN | OXYGEN SATURATION: 97 % | WEIGHT: 147.94 LBS | DIASTOLIC BLOOD PRESSURE: 78 MMHG

## 2024-11-11 DIAGNOSIS — N18.32 STAGE 3B CHRONIC KIDNEY DISEASE: ICD-10-CM

## 2024-11-11 DIAGNOSIS — I69.354 HEMIPLEGIA AND HEMIPARESIS FOLLOWING CEREBRAL INFARCTION AFFECTING LEFT NON-DOMINANT SIDE: ICD-10-CM

## 2024-11-11 DIAGNOSIS — R97.20 ELEVATED PSA, LESS THAN 10 NG/ML: ICD-10-CM

## 2024-11-11 DIAGNOSIS — R26.2 DIFFICULTY WALKING: ICD-10-CM

## 2024-11-11 DIAGNOSIS — R42 VERTIGO: ICD-10-CM

## 2024-11-11 DIAGNOSIS — I10 ESSENTIAL HYPERTENSION: Primary | ICD-10-CM

## 2024-11-11 PROCEDURE — G2211 COMPLEX E/M VISIT ADD ON: HCPCS | Mod: S$PBB,,, | Performed by: INTERNAL MEDICINE

## 2024-11-11 PROCEDURE — 99214 OFFICE O/P EST MOD 30 MIN: CPT | Mod: S$PBB,,, | Performed by: INTERNAL MEDICINE

## 2024-11-11 PROCEDURE — 99213 OFFICE O/P EST LOW 20 MIN: CPT | Mod: PBBFAC,PO | Performed by: INTERNAL MEDICINE

## 2024-11-11 PROCEDURE — 99999 PR PBB SHADOW E&M-EST. PATIENT-LVL III: CPT | Mod: PBBFAC,,, | Performed by: INTERNAL MEDICINE

## 2024-11-11 NOTE — TELEPHONE ENCOUNTER
Care Due:                  Date            Visit Type   Department     Provider  --------------------------------------------------------------------------------                                EP -                              [facility] FAMILY  Last Visit: 11-      CARE ([facility])   SHUBHAM Luo                               -                              [facility]  Next Visit: 02-      CARE ([facility])   MEDICINE       Rordigue Luo                                                            Last  Test          Frequency    Reason                     Performed    Due Date  --------------------------------------------------------------------------------    Lipid Panel.  12 months..  rosuvastatin.............  02- 02-    [facility] Embedded Care Due Messages. Reference number: [id number].   11/11/2024 1:55:50 PM CST

## 2024-11-11 NOTE — PROGRESS NOTES
Outpatient Physical Therapy  [place]           [x] Phone: [phone]   Fax: [phone]  [place]           [] Phone: [phone]   Fax: [phone]        Physical Therapy Daily Treatment Note  Date:  9/3/2024    Patient Name:  Madiha Martin    :  1951  MRN: [id number]  Restrictions/Precautions:   Sling for 6 weeks, no active resistance to bicep till 6 weeks. ER to 30 deg, scaption to 120 deg till 6 weeks.   Diagnosis: Nontraumatic complete tear of right rotator cuff [M75.121]    s/p R Reverse TSA   Chronic pain in right shoulder [M25.511, G89.29]   Date of Injury/Surgery: 3/24/24    Treatment Diagnosis:  R UE stiffness, pain, weakness  Insurance/Certification information: [employer]       Pt approved for 10 more visits  for a total of 40      [id number]  only     Til  24     Auth# [id number]        Referring Physician:  Maximo Mary DO   PCP: Jake Dumont MD  Next Doctor Visit:    Plan of care signed (Y/N):  Yes  Outcome Measure: Quick DASH: 86% disability   Visit# / total visits: 35/40  Pain level:    2 10     Goals:     Patient goals: full use to return to PLOF  Short term goals  Time Frame for Short term goals: Defer to Long Term Goals    Long Term Goals Completed by 8 weeks 24       Long Term Goals: 6 weeks   Long Term Goal 1: Pt will demo I with HEP/symptom management. Progressing  Long Term Goal 2: Pt will demo able to reach overhead with no increase in pain to ease overhead reaching.  Not MET   Long Term Goal 3: Pt will demo >20 deg improvement in R UE A/PROM to ADLs. MET   Long Term Goal 4: Pt will demo able to reach behind head to complete hair management with min/no increase in pain. Partially MET   Long Term Goal 5: Pt will demo <40% disability per quick DASH to demo improved UE mobility. MET   Long Term Goal 6: Pt will demo >4/5 R UE strength within available motion to ease light lifting. MET with all except R shoulder  "[facility]  Primary Care   [address]       Patient ID: Giovanny Chaudhari Jr.     Chief Complaint:   Chief Complaint   Patient presents with    Follow-up     6 week         HPI:  6 week follow-up after we stopped losartan and started Flomax in an effort to help reduce so prevent his nocturia and control his blood pressure.  Blood pressure still looks good and he is only getting up 3 times a night to urinate I am happy about that.  He did not have any symptoms of orthostatic hypotension.  Repeat labs still shows that has creatinine is 1.6 in his may take prolonged time to recover as I do think he probably had a component of postobstructive uropathy.  Prostate specific antigen still is elevated and could be due to residual inflammation and/ or BPH. I offered him an appointment Urology but he politely declined and I think that is the correct answer.  He was trying to keep hydrated and drinks anywhere from 3-4 normal bottles of water per day so I want him to continue that.  We will see each other again after the holidays for routine check in and I do wish him well.    Review of Systems       Negative    Objective:      Physical Exam   Physical Exam       Ambulates with cane     Vitals:   Vitals:    11/11/24 1004   BP: 132/78   Pulse: 103   SpO2: 97%   Weight: 67.1 kg (147 lb 14.9 oz)   Height: 5' 7" (1.702 m)        Assessment:           Plan:       Giovanny Chaudhari Jr.  was seen today for follow-up and may need lab work.    Diagnoses and all orders for this visit:    Giovanny Solano" was seen today for follow-up.    Diagnoses and all orders for this visit:    Essential hypertension  Controlled with Flomax     Stage 3b chronic kidney disease  Monitor Labs    Difficulty walking  Ambulates with cane    Hemiplegia and hemiparesis following cerebral infarction affecting left non-dominant side  Stable, Ambulates with cane     Elevated PSA- could still be due to prostatic inflammation or BPH "     Visit today included increased complexity associated with the care of the episodic problem hypertension Chronic Kidney Disease 3 Difficulty Walking Elevated PSA addressed and managing the longitudinal care of the patient due to the serious and/or complex managed problem(s) .           Rodrigue Luo MD

## 2024-11-12 DIAGNOSIS — R11.0 NAUSEA: ICD-10-CM

## 2024-11-12 RX ORDER — MECLIZINE HYDROCHLORIDE 25 MG/1
25 TABLET ORAL 3 TIMES DAILY PRN
Qty: 90 TABLET | Refills: 0 | Status: SHIPPED | OUTPATIENT
Start: 2024-11-12 | End: 2024-12-12

## 2024-11-12 NOTE — TELEPHONE ENCOUNTER
Refill Routing Note   Medication(s) are not appropriate for processing by [facility] for the following reason(s):        Outside of protocol    [facility] action(s):  Route               Appointments  past 12m or future 3m with PCP    Date Provider   Last Visit   11/11/2024 Rodrigue Luo MD   Next Visit   2/11/2025 Rodrigue Luo MD   ED visits in past 90 days: 0        Note composed:11:53 AM 11/12/2024

## 2024-11-13 RX ORDER — ONDANSETRON 4 MG/1
4 TABLET, FILM COATED ORAL EVERY 6 HOURS PRN
Qty: 120 TABLET | Refills: 0 | OUTPATIENT
Start: 2024-11-13 | End: 2024-12-13

## 2024-12-18 ENCOUNTER — OFFICE VISIT (OUTPATIENT)
Facility: CLINIC | Age: 84
End: 2024-12-18
Payer: MEDICARE

## 2024-12-18 DIAGNOSIS — L81.4 LENTIGINES: ICD-10-CM

## 2024-12-18 DIAGNOSIS — L82.0 INFLAMED SEBORRHEIC KERATOSIS: ICD-10-CM

## 2024-12-18 DIAGNOSIS — L57.0 AK (ACTINIC KERATOSIS): ICD-10-CM

## 2024-12-18 DIAGNOSIS — D18.01 CHERRY ANGIOMA: ICD-10-CM

## 2024-12-18 DIAGNOSIS — L73.8 SEBACEOUS HYPERPLASIA: ICD-10-CM

## 2024-12-18 DIAGNOSIS — L82.1 SK (SEBORRHEIC KERATOSIS): ICD-10-CM

## 2024-12-18 DIAGNOSIS — D22.9 MULTIPLE BENIGN NEVI: Primary | ICD-10-CM

## 2024-12-18 PROCEDURE — 99999 PR PBB SHADOW E&M-EST. PATIENT-LVL II: CPT | Mod: PBBFAC,,, | Performed by: DERMATOLOGY

## 2024-12-18 PROCEDURE — 17003 DESTRUCT PREMALG LES 2-14: CPT | Mod: PBBFAC,PN | Performed by: DERMATOLOGY

## 2024-12-18 PROCEDURE — 99213 OFFICE O/P EST LOW 20 MIN: CPT | Mod: 25,S$PBB,, | Performed by: DERMATOLOGY

## 2024-12-18 PROCEDURE — 17110 DESTRUCTION B9 LES UP TO 14: CPT | Mod: 59,PBBFAC,PN | Performed by: DERMATOLOGY

## 2024-12-18 PROCEDURE — 17000 DESTRUCT PREMALG LESION: CPT | Mod: S$PBB,XS,, | Performed by: DERMATOLOGY

## 2024-12-18 PROCEDURE — 17000 DESTRUCT PREMALG LESION: CPT | Mod: PBBFAC,PN | Performed by: DERMATOLOGY

## 2024-12-18 PROCEDURE — 17003 DESTRUCT PREMALG LES 2-14: CPT | Mod: S$PBB,XS,, | Performed by: DERMATOLOGY

## 2024-12-18 PROCEDURE — 99212 OFFICE O/P EST SF 10 MIN: CPT | Mod: PBBFAC,PN,25 | Performed by: DERMATOLOGY

## 2024-12-18 PROCEDURE — 17110 DESTRUCTION B9 LES UP TO 14: CPT | Mod: S$PBB,,, | Performed by: DERMATOLOGY

## 2024-12-18 NOTE — PROGRESS NOTES
Subjective:      Patient ID:  Giovanny Chaudhari Jr. is a 84 y.o. male who presents for   Chief Complaint   Patient presents with    Follow-up     UBSC     HPI  Established patient.  Here today for upper body skin exam.    Hx of NMSC, AK.   C/o rough forehead, spot on ear, new lump at L chest.      6/2024  1. Skin, right vertex, shave biopsy:  -ACTINIC KERATOSIS, INVOLVING HAIR FOLLICLES  2. Skin, right forehead, shave biopsy:  -GRANULOMATOUS INFLAMMATION, see comment  COMMENT:  The histological findings are non-specific but suggestive of a previously ruptured cyst or hair follicle.  Clinical correlation is recommended.  3.  Skin, right mid chest, shave biopsy:  -MELANOCYTIC NEVUS, INTRADERMAL TYPE  4. Skin, right scapula, shave biopsy:  -SEBORRHEIC KERATOSIS, IRRITATED AND INFLAMED     +NMSC  SCCIS at L antihelix s/p shave and adjuvant Efudex course 2/2024  BCC, infiltrative, at L temple s/p Mohs 9/2023  SCC-WD at R lower cheek s/p Mohs 8/2023      Past Medical History:   Diagnosis Date    Debility     History of ischemic cerebrovascular accident (CVA) with residual deficit     Hypertension     Other hyperlipidemia     Stroke     Ischemic CVA     Review of Systems   Constitutional:  Negative for fever, chills and fatigue.   HENT:  Negative for nosebleeds and congestion.    Respiratory:  Negative for cough and shortness of breath.    Gastrointestinal:  Negative for vomiting.   Skin:  Positive for rash. Negative for itching, sun sensitivity and daily sunscreen use.   Hematologic/Lymphatic: Bruises/bleeds easily.       Objective:   Physical Exam   Constitutional: He appears well-developed and well-nourished. No distress.   Neurological: He is alert and oriented to person, place, and time. He is not disoriented.   Psychiatric: He has a normal mood and affect.   Skin:   Areas Examined (abnormalities noted in diagram):   Scalp / Hair Palpated and Inspected  Head / Face Inspection Performed  Neck Inspection  Performed  Chest / Axilla Inspection Performed  Abdomen Inspection Performed  Back Inspection Performed                     Diagram Legend     Erythematous scaling macule/papule c/w actinic keratosis       Vascular papule c/w angioma      Pigmented verrucoid papule/plaque c/w seborrheic keratosis      Yellow umbilicated papule c/w sebaceous hyperplasia      Irregularly shaped tan macule c/w lentigo     1-2 mm smooth white papules consistent with Milia      Movable subcutaneous cyst with punctum c/w epidermal inclusion cyst      Subcutaneous movable cyst c/w pilar cyst      Firm pink to brown papule c/w dermatofibroma      Pedunculated fleshy papule(s) c/w skin tag(s)      Evenly pigmented macule c/w junctional nevus     Mildly variegated pigmented, slightly irregular-bordered macule c/w mildly atypical nevus      Flesh colored to evenly pigmented papule c/w intradermal nevus       Pink pearly papule/plaque c/w basal cell carcinoma      Erythematous hyperkeratotic cursted plaque c/w SCC      Surgical scar with no sign of skin cancer recurrence      Open and closed comedones      Inflammatory papules and pustules      Verrucoid papule consistent consistent with wart     Erythematous eczematous patches and plaques     Dystrophic onycholytic nail with subungual debris c/w onychomycosis     Umbilicated papule    Erythematous-base heme-crusted tan verrucoid plaque consistent with inflamed seborrheic keratosis     Erythematous Silvery Scaling Plaque c/w Psoriasis     See annotation    Assessment / Plan:      AK (actinic keratosis)  - Discussed diagnosis, etiology, and precancerous nature of condition.   - Cryosurgery Procedure Note: Discussed procedure with patient/patient's guardian including risks and benefits as well as treatment alternatives. Risks of procedure include pain, itching, swelling, redness, blistering, crusting, wound formation, post-inflammatory pigmentary alteration, scar, recurrence. Verbal consent  obtained. LN2 cryosurgery performed to 12 lesion(s). Patient tolerated procedure well. After-visit wound care instructions reviewed and provided in writing.      ISK  - Discussed diagnosis, etiology, and treatment options.   - Cryosurgery Procedure Note: Discussed procedure with patient/patient's guardian including risks and benefits as well as treatment alternatives. Risks of procedure include pain, itching, swelling, redness, blistering, crusting, wound formation, post-inflammatory pigmentary alteration, scar, recurrence. Verbal consent obtained. LN2 cryosurgery performed to 2 lesion(s). Patient tolerated procedure well. After-visit wound care instructions reviewed and provided in writing.     Multiple benign nevi  - Discussed diagnosis, etiology, and benign-nature of condition.  - Reassured; no lesions suspicious for malignancy noted on exam today.   - Recommended routine self examination of skin. Discussed the ABCDEs of melanoma and ugly duckling sign.   - Recommended daily sun protection, including the use of OTC broad-spectrum sunscreen (SPF 30 or greater) and sun-protective clothing.      SK (seborrheic keratosis)  Acrochordon  Cherry angioma  Sebaceous hyperplasia   - Benign; reassured treatment not necessary.      History of nonmelanoma skin cancer  Screening for skin cancer  - Upper body skin examination performed today.  - Findings listed above.   - Sites of prior malignancy examined - no concern for recurrence today.    - Recommended routine self examination of skin.    - Recommended daily sun protection, including the use of OTC broad-spectrum sunscreen (SPF 30 or greater) and sun-protective clothing.      Seborrheic dermatitis  C/w ketoconazole shampoo to scalp, ears, face at least 2-3x weekly.            Follow up for spot recheck 1-2 months.

## 2024-12-18 NOTE — PATIENT INSTRUCTIONS
What Are the Symptoms of Skin Cancer?  A change in your skin is the most common sign of skin cancer. This could be a new growth, a sore that doesnt heal, or a change in a mole. Not all skin cancers look the same.    For melanoma specifically, a simple way to remember the warning signs is to remember the A-B-C-D-Es of melanoma--    A stands for asymmetrical. Does the mole or spot have an irregular shape with two parts that look very different?  B stands for border. Is the border irregular or jagged?  C is for color. Is the color uneven?  D is for diameter. Is the mole or spot larger than the size of a pea?  E is for evolving. Has the mole or spot changed during the past few weeks or months?    Talk to your doctor if you notice changes in your skin such as a new growth, a sore that doesnt heal, a change in an old growth, or any of the A-B-C-D-Es of melanoma    What Can I Do to Reduce My Risk of Skin Cancer?  Protection from ultraviolet (UV) radiation is important all year, not just during the summer or at the beach. UV rays from the sun can reach you on cloudy and hazy days, not just on bright and philippe days. UV rays also reflect off of surfaces like water, cement, sand, and snow. Indoor tanning (using a tanning bed, llamas, or sunlamp to get tan) exposes users to UV radiation.    The hours between 10 a.m. and 4 p.m. Daylight Saving Time (9 a.m. to 3 p.m. standard time) are the most hazardous for UV exposure outdoors in the continental [place]. UV rays from sunlight are the greatest during the late spring and early summer in [place].    CDC recommends easy options for protection from UV radiation--    Stay in the shade or indoors, especially during midday hours.  Wear clothing that covers your arms and legs.  Wear a hat with a wide brim to shade your face, head, ears, and neck.  Wear sunglasses that wrap around and block both UVA and UVB rays.  Use sunscreen with a sun protection factor (SPF) of  30 or higher, and both UVA and UVB (broad spectrum) protection.  Avoid indoor tanning.    Adapted from https://www.cdc.gov/cancer/skin/basic_info/   CRYOSURGERY      Your doctor has used a method called cryosurgery to treat your skin condition. Cryosurgery refers to the use of very cold substances to treat a variety of skin conditions such as warts, pre-skin cancers, molluscum contagiosum, sun spots, and several benign growths. The substance we use in cryosurgery is liquid nitrogen and is so cold (-195 degrees Celsius) that is burns when administered.     Following treatment in the office, the skin may immediately burn and become red. You may find the area around the lesion is affected as well. It is sometimes necessary to treat not only the lesion, but a small area of the surrounding normal skin to achieve a good response.     A blister, and even a blood filled blister, may form after treatment.   This is a normal response. If the blister is painful, it is acceptable to sterilize a needle and with rubbing alcohol and gently pop the blister. It is important that you gently wash the area with soap and warm water as the blister fluid may contain wart virus if a wart was treated. Do no remove the roof of the blister.     The area treated can take anywhere from 1-3 weeks to heal. Healing time depends on the kind skin lesion treated, the location, and how aggressively the lesion was treated. It is recommended that the areas treated are covered with Vaseline or bacitracin ointment and a band-aid. If a band-aid is not practical, just ointment applied several times per day will do. Keeping these areas moist will speed the healing time.    Treatment with liquid nitrogen can leave a scar. In dark skin, it may be a light or dark scar, in light skin it may be a white or pink scar. These will generally fade with time, but may never go away completely.     If you have any concerns after your treatment, please feel free to call  the office.       [facility] [place] [address]/ [phone] FAX/ [email or web address]

## 2025-01-13 DIAGNOSIS — Z00.00 ENCOUNTER FOR MEDICARE ANNUAL WELLNESS EXAM: ICD-10-CM

## 2025-02-11 ENCOUNTER — OFFICE VISIT (OUTPATIENT)
Dept: FAMILY MEDICINE | Facility: CLINIC | Age: 85
End: 2025-02-11
Payer: MEDICARE

## 2025-02-11 VITALS
OXYGEN SATURATION: 97 % | SYSTOLIC BLOOD PRESSURE: 132 MMHG | WEIGHT: 152.75 LBS | HEART RATE: 85 BPM | BODY MASS INDEX: 23.98 KG/M2 | DIASTOLIC BLOOD PRESSURE: 82 MMHG | HEIGHT: 67 IN

## 2025-02-11 DIAGNOSIS — I70.0 AORTIC ATHEROSCLEROSIS: ICD-10-CM

## 2025-02-11 DIAGNOSIS — I47.29 VENTRICULAR TACHYCARDIA (PAROXYSMAL): ICD-10-CM

## 2025-02-11 DIAGNOSIS — E78.49 OTHER HYPERLIPIDEMIA: ICD-10-CM

## 2025-02-11 DIAGNOSIS — I10 ESSENTIAL HYPERTENSION: Primary | ICD-10-CM

## 2025-02-11 DIAGNOSIS — D64.9 ANEMIA, UNSPECIFIED TYPE: ICD-10-CM

## 2025-02-11 DIAGNOSIS — R26.2 DIFFICULTY WALKING: ICD-10-CM

## 2025-02-11 DIAGNOSIS — N18.32 STAGE 3B CHRONIC KIDNEY DISEASE: ICD-10-CM

## 2025-02-11 DIAGNOSIS — I69.30 HISTORY OF ISCHEMIC CEREBROVASCULAR ACCIDENT (CVA) WITH RESIDUAL DEFICIT: ICD-10-CM

## 2025-02-11 DIAGNOSIS — I69.354 HEMIPLEGIA AND HEMIPARESIS FOLLOWING CEREBRAL INFARCTION AFFECTING LEFT NON-DOMINANT SIDE: ICD-10-CM

## 2025-02-11 PROBLEM — R42 VERTIGO: Status: RESOLVED | Noted: 2024-07-26 | Resolved: 2025-02-11

## 2025-02-11 PROCEDURE — 99214 OFFICE O/P EST MOD 30 MIN: CPT | Mod: S$PBB,,, | Performed by: INTERNAL MEDICINE

## 2025-02-11 PROCEDURE — 99999 PR PBB SHADOW E&M-EST. PATIENT-LVL III: CPT | Mod: PBBFAC,,, | Performed by: INTERNAL MEDICINE

## 2025-02-11 PROCEDURE — 99213 OFFICE O/P EST LOW 20 MIN: CPT | Mod: PBBFAC,PO | Performed by: INTERNAL MEDICINE

## 2025-02-11 PROCEDURE — G2211 COMPLEX E/M VISIT ADD ON: HCPCS | Mod: S$PBB,,, | Performed by: INTERNAL MEDICINE

## 2025-02-11 RX ORDER — CLOPIDOGREL BISULFATE 75 MG/1
75 TABLET ORAL DAILY
Qty: 90 TABLET | Refills: 3 | Status: SHIPPED | OUTPATIENT
Start: 2025-02-11 | End: 2026-02-11

## 2025-02-11 RX ORDER — ROSUVASTATIN CALCIUM 5 MG/1
5 TABLET, COATED ORAL NIGHTLY
Qty: 90 TABLET | Refills: 3 | Status: SHIPPED | OUTPATIENT
Start: 2025-02-11 | End: 2026-02-11

## 2025-02-11 NOTE — PROGRESS NOTES
"[facility]  Primary Care   [address] [facility] [address]       Patient ID: Giovanny Chaudhari Jr.     Chief Complaint:   Chief Complaint   Patient presents with    Annual Exam     General wellness exam        HPI:  Routine follow-up and overall I think he is doing okay.  He is still only getting up 3 times at night to urinate with the Flomax so that has a when in my mind.  He does have a cane that he is using to help walk today but admits that he does not use any assist device at home.  I inquired about the use of a walker but he has a thresholds at various areas in the walker would get caught on the so I invite him to use a walker when he goes out and possibly a cane when he is in the house, just anything to keep him from falling.  He is up-to-date with the majority of his health maintenance as far as immunizations go.  If he is interested in a RSV vaccine, he can get 1 from his local pharmacy.  Labs back in September were reviewed and they were okay for the most part but I would like to keep an eye in a few of them so I would like to get some today or at his next earliest convenience.  I did refill a few medicines for him as well.  Physical exam wise, he is heart and lungs sound good.    Review of Systems       Difficulty walking     Objective:      Physical Exam   Physical Exam       Ambulates with cane     Vitals:   Vitals:    02/11/25 1022   BP: 132/82   Pulse: 85   SpO2: 97%   Weight: 69.3 kg (152 lb 12.5 oz)   Height: 5' 7" (1.702 m)        Assessment:           Plan:       Giovanny Chaudhari Jr.  was seen today for follow-up and may need lab work.    Diagnoses and all orders for this visit:    Giovanny Solano" was seen today for annual exam.    Diagnoses and all orders for this visit:    Essential hypertension  Controlled with Flomax    Ventricular tachycardia (paroxysmal)  Controlled    Hemiplegia and hemiparesis following cerebral infarction affecting left non-dominant side  Stable    Stage 3b " chronic kidney disease  -     Comprehensive Metabolic Panel; Future  -     CBC Auto Differential; Future  Monitor labs    History of ischemic cerebrovascular accident (CVA) with residual deficit  -     clopidogreL (PLAVIX) 75 mg tablet; Take 1 tablet (75 mg total) by mouth once daily.  Controlled with rosuvastatin and Plavix    Other hyperlipidemia  -     rosuvastatin (CRESTOR) 5 MG tablet; Take 1 tablet (5 mg total) by mouth every evening.  Controlled with rosuvastatin    Aortic atherosclerosis  On a statin    Anemia, unspecified type  Mild and chronic but monitor    Difficulty walking  Ambulates with cane     Visit today included increased complexity associated with the care of the episodic problem hypertension history of CVA hyperlipidemia Difficulty walking  addressed and managing the longitudinal care of the patient due to the serious and/or complex managed problem(s) .           Rodrigue Luo MD

## 2025-03-06 ENCOUNTER — TELEPHONE (OUTPATIENT)
Dept: DERMATOLOGY | Facility: CLINIC | Age: 85
End: 2025-03-06
Payer: MEDICARE

## 2025-03-06 NOTE — TELEPHONE ENCOUNTER
----- Message from Ellen sent at 3/6/2025  4:07 PM CST -----  Type:  Needs Medical AdviceWho Called: usama gonzalezWould the patient rather a call back or a response via [facility]? Call [facility] Call Back Number: [phone] Additional Information: pt would like to tavon appt. 3/12. please call to discuss

## 2025-03-18 ENCOUNTER — PROCEDURE VISIT (OUTPATIENT)
Dept: DERMATOLOGY | Facility: CLINIC | Age: 85
End: 2025-03-18
Payer: MEDICARE

## 2025-03-18 DIAGNOSIS — D48.5 NEOPLASM OF UNCERTAIN BEHAVIOR OF SKIN: Primary | ICD-10-CM

## 2025-03-18 PROCEDURE — 11402 EXC TR-EXT B9+MARG 1.1-2 CM: CPT | Mod: PBBFAC,PN | Performed by: DERMATOLOGY

## 2025-03-18 PROCEDURE — 88305 TISSUE EXAM BY PATHOLOGIST: CPT | Mod: PO | Performed by: DERMATOLOGY

## 2025-03-18 PROCEDURE — 13100 CMPLX RPR TRUNK 1.1-2.5 CM: CPT | Mod: PBBFAC,PN | Performed by: DERMATOLOGY

## 2025-03-18 NOTE — PROGRESS NOTES
EXCISION WITH LINEAR CLOSURE OPERATIVE NOTE  Name: Giovanny Chaudhari Jr.   Patient : 1940  Date of Service: 3/18/2025  Attending Surgeon: Uyen Mullins MD FAAD  Assistants: Naa Alvarez - Surgical Technician  Anesthetic Agent: 1% lidocaine with 1:100,000 epinephrine  Clinical Diagnosis:  neoplasm uncertain behavior of skin of the chest1  Operations: Excisional biopsy of subcutaneous growing lesion  Indication:  growing subcutaneous neoplasm of uncertain behavior    Location: chest  Surgical Preparation: chlorhexidine gluconate    Description of Operation:  The nature and purpose of the procedure, associated risks and alternative treatments were explained to the patient in detail. All patient questions were answered completely. An informed operative consent and photography permit were obtained. The lesion was then identified and marked with agreement by the patient of the correct location. The patient was positioned, prepped, and draped in the usual sterile manner. Local anesthesia was obtained in a ring block fashion around the lesion and underneath.  8 cc(s) of 1% lidocaine with 1:100,000 epinephrine. The lesion pre-operatively measures 1.5 by 1.5 cm.    A superficial incision was made with a #15 blade until the cyst wall was visualized. Adson forceps and supercut curved iris scissors were utilized to dissect out the cyst wall and remove it in toto along with a narrow elipse of skin. The specimen was placed in formalin and sent for formal histopathology. This resulted in a final excision size measuring 2.0 x 1.8 cm.     The defect edges were debeveled with a #15 scalpel blade. The defect was widely undermined in the deep subcutaneous plane at least 100% of the defect diameter to allow for maximum tissue movement. Hemostasis was achieved with spot electrodessication. The defect was closed first utilizing multiple 4-0 Monocryl interrupted buried subcutaneous sutures; these sutures were also used  to approximate the wound bed to the underside of the dermis and prevent hematoma/seroma formation. This resulted in excellent apposition of the dermis and epidermis, removing tension from the wound.The epidermal edges throughout were further fastened superficially with 4-0 Prolene. The final length of the repair was 2.2 cm.  The patient tolerated the procedure well and there were no complications.  Polysporin, non-adherent gauze and a pressure dressing were applied to wound after gentle cleansing with saline.    Patient instructed in and provided with instructions for post-op care, will RTC in 10 days for suture removal  Post op meds: None    Photos:        Uyen Mullins MD

## 2025-03-21 LAB
FINAL PATHOLOGIC DIAGNOSIS: NORMAL
GROSS: NORMAL
Lab: NORMAL
MICROSCOPIC EXAM: NORMAL

## 2025-03-24 DIAGNOSIS — E78.49 OTHER HYPERLIPIDEMIA: ICD-10-CM

## 2025-03-24 DIAGNOSIS — I69.30 HISTORY OF ISCHEMIC CEREBROVASCULAR ACCIDENT (CVA) WITH RESIDUAL DEFICIT: ICD-10-CM

## 2025-03-24 NOTE — TELEPHONE ENCOUNTER
Care Due:                  Date            Visit Type   Department     Provider  --------------------------------------------------------------------------------                                EP -                              [facility] FAMILY  Last Visit: 02-      CARE ([facility])   SHUBHAM Luo                               -                              [facility]  Next Visit: 08-      CARE ([facility])   MEDICINE       Rodrigue Luo                                                            Last  Test          Frequency    Reason                     Performed    Due Date  --------------------------------------------------------------------------------    Lipid Panel.  12 months..  rosuvastatin.............  02- 02-    Health [facility] Embedded Care Due Messages. Reference number: [id number].   3/24/2025 8:59:16 AM CDT

## 2025-03-25 ENCOUNTER — RESULTS FOLLOW-UP (OUTPATIENT)
Dept: DERMATOLOGY | Facility: CLINIC | Age: 85
End: 2025-03-25

## 2025-03-26 ENCOUNTER — TELEPHONE (OUTPATIENT)
Dept: DERMATOLOGY | Facility: CLINIC | Age: 85
End: 2025-03-26
Payer: MEDICARE

## 2025-03-26 DIAGNOSIS — I69.30 HISTORY OF ISCHEMIC CEREBROVASCULAR ACCIDENT (CVA) WITH RESIDUAL DEFICIT: ICD-10-CM

## 2025-03-26 DIAGNOSIS — E78.49 OTHER HYPERLIPIDEMIA: ICD-10-CM

## 2025-03-26 RX ORDER — CLOPIDOGREL BISULFATE 75 MG/1
75 TABLET ORAL
Qty: 90 TABLET | Refills: 1 | Status: SHIPPED | OUTPATIENT
Start: 2025-03-26 | End: 2025-03-26 | Stop reason: SDUPTHER

## 2025-03-26 RX ORDER — CLOPIDOGREL BISULFATE 75 MG/1
75 TABLET ORAL DAILY
Qty: 90 TABLET | Refills: 3 | Status: SHIPPED | OUTPATIENT
Start: 2025-03-26 | End: 2026-03-26

## 2025-03-26 RX ORDER — ROSUVASTATIN CALCIUM 5 MG/1
5 TABLET, COATED ORAL NIGHTLY
Qty: 90 TABLET | Refills: 3 | Status: SHIPPED | OUTPATIENT
Start: 2025-03-26 | End: 2025-03-26 | Stop reason: SDUPTHER

## 2025-03-26 RX ORDER — ROSUVASTATIN CALCIUM 5 MG/1
5 TABLET, COATED ORAL NIGHTLY
Qty: 90 TABLET | Refills: 3 | Status: SHIPPED | OUTPATIENT
Start: 2025-03-26 | End: 2026-03-26

## 2025-03-26 NOTE — TELEPHONE ENCOUNTER
No care due was identified.  [facility] Embedded Care Due Messages. Reference number: [id number].   3/26/2025 4:00:27 PM CDT

## 2025-03-26 NOTE — TELEPHONE ENCOUNTER
----- Message from Uyen Mullins MD sent at 3/25/2025  8:57 AM CDT -----  Benign result, reassure patient please  ----- Message -----  From: [facility]  Sent: 3/21/2025  12:53 PM CDT  To: Uyen Mullins MD

## 2025-03-26 NOTE — TELEPHONE ENCOUNTER
Refill Routing Note   Medication(s) are not appropriate for processing by [facility] for the following reason(s):        Required labs outdated    [facility] action(s):  Defer  Approve   Requires labs : Yes             Appointments  past 12m or future 3m with PCP    Date Provider   Last Visit   2/11/2025 Rodrigue Luo MD   Next Visit   8/12/2025 Rodrigue Luo MD   ED visits in past 90 days: 0        Note composed:11:52 AM 03/26/2025

## 2025-03-27 ENCOUNTER — CLINICAL SUPPORT (OUTPATIENT)
Dept: DERMATOLOGY | Facility: CLINIC | Age: 85
End: 2025-03-27
Payer: MEDICARE

## 2025-03-27 DIAGNOSIS — Z48.02 ENCOUNTER FOR REMOVAL OF SUTURES: Primary | ICD-10-CM

## 2025-03-27 PROCEDURE — 99211 OFF/OP EST MAY X REQ PHY/QHP: CPT | Mod: PBBFAC,PN

## 2025-03-27 PROCEDURE — 99999 PR PBB SHADOW E&M-EST. PATIENT-LVL I: CPT | Mod: PBBFAC,,,

## 2025-03-27 PROCEDURE — 99024 POSTOP FOLLOW-UP VISIT: CPT | Mod: POP,,, | Performed by: DERMATOLOGY

## 2025-03-27 NOTE — NURSING
Suture Removal Note   Patient Name: Giovanny Chaudhari Jr.  Patient : 1940  Date of Service: 3/27/2025    CC: Pt. Presents for removal of sutures on the Left Chest today  Subjective: patient reports wound healing as expected     Objective: Wound well healed, sutures clean/dry/intact. No evidence of any complications noted.     Visit For Suture Removal:  - Suture removal today  - Steri strips/mastisol were not applied  - Patient counseled on silicone gel/silicone sheeting and their use in the management of post-operative scars  - Handout on silicone gel/silicone gel sheeting was provided  - Patient to follow up with their referring provider in 3 months for further skin evaluation    Soraida Johnson

## 2025-03-29 DIAGNOSIS — E78.49 OTHER HYPERLIPIDEMIA: ICD-10-CM

## 2025-03-29 DIAGNOSIS — I69.30 HISTORY OF ISCHEMIC CEREBROVASCULAR ACCIDENT (CVA) WITH RESIDUAL DEFICIT: ICD-10-CM

## 2025-03-29 RX ORDER — ROSUVASTATIN CALCIUM 5 MG/1
5 TABLET, COATED ORAL NIGHTLY
Qty: 90 TABLET | Refills: 3 | OUTPATIENT
Start: 2025-03-29 | End: 2026-03-29

## 2025-03-29 RX ORDER — CLOPIDOGREL BISULFATE 75 MG/1
75 TABLET ORAL DAILY
Qty: 90 TABLET | Refills: 3 | OUTPATIENT
Start: 2025-03-29 | End: 2026-03-29

## 2025-03-29 NOTE — TELEPHONE ENCOUNTER
No care due was identified.  Health [facility] Embedded Care Due Messages. Reference number: [id number].   3/29/2025 1:14:16 PM CDT

## 2025-05-09 NOTE — PROGRESS NOTES
----- Message from Cristina EcovisionAurora East Hospital:Telephone Information:[employer]          [phone]   1. Skin, right vertex, shave biopsy:  -ACTINIC KERATOSIS, INVOLVING HAIR FOLLICLES  2. Skin, right forehead, shave biopsy:  -GRANULOMATOUS INFLAMMATION, see comment  COMMENT:  The histological findings are non-specific but suggestive of a previously ruptured cyst or hair follicle.  Clinical correlation is recommended.  3.  Skin, right mid chest, shave biopsy:  -MELANOCYTIC NEVUS, INTRADERMAL TYPE  4. Skin, right scapula, shave biopsy:  -SEBORRHEIC KERATOSIS, IRRITATED AND INFLAMED    Please call to discuss results / plan / schedule:   1) precancerous lesion, likely treated with biopsy itself, no further treatment necessary.   2-4) benign, no further treatment necessary.   Please schedule 6mo f/u skin check. Thank you

## 2025-05-15 PROBLEM — S72.009A FEMORAL NECK FRACTURE: Status: ACTIVE | Noted: 2025-05-15

## 2025-05-15 PROBLEM — S72.002A LEFT DISPLACED FEMORAL NECK FRACTURE: Status: ACTIVE | Noted: 2025-05-15

## 2025-05-17 PROBLEM — R50.82 POSTOPERATIVE FEVER: Status: ACTIVE | Noted: 2025-05-17

## 2025-05-18 PROBLEM — K59.00 CONSTIPATION: Status: ACTIVE | Noted: 2025-05-18

## 2025-05-19 DIAGNOSIS — M25.552 PAIN OF LEFT HIP: Primary | ICD-10-CM

## 2025-05-29 ENCOUNTER — OFFICE VISIT (OUTPATIENT)
Dept: ORTHOPEDICS | Facility: CLINIC | Age: 85
End: 2025-05-29
Payer: MEDICARE

## 2025-05-29 ENCOUNTER — HOSPITAL ENCOUNTER (OUTPATIENT)
Dept: RADIOLOGY | Facility: HOSPITAL | Age: 85
Discharge: HOME | End: 2025-05-29
Attending: ORTHOPAEDIC SURGERY
Payer: MEDICARE

## 2025-05-29 VITALS — BODY MASS INDEX: 22.91 KG/M2 | WEIGHT: 145.94 LBS | HEIGHT: 67 IN

## 2025-05-29 DIAGNOSIS — S72.002A LEFT DISPLACED FEMORAL NECK FRACTURE: Primary | ICD-10-CM

## 2025-05-29 DIAGNOSIS — M25.552 PAIN OF LEFT HIP: ICD-10-CM

## 2025-05-29 PROCEDURE — 73502 X-RAY EXAM HIP UNI 2-3 VIEWS: CPT | Mod: TC,PO,LT

## 2025-05-29 PROCEDURE — 99213 OFFICE O/P EST LOW 20 MIN: CPT | Mod: PBBFAC,25,PO | Performed by: ORTHOPAEDIC SURGERY

## 2025-05-29 PROCEDURE — 99024 POSTOP FOLLOW-UP VISIT: CPT | Mod: POP,,, | Performed by: ORTHOPAEDIC SURGERY

## 2025-05-29 PROCEDURE — 73502 X-RAY EXAM HIP UNI 2-3 VIEWS: CPT | Mod: 26,LT,, | Performed by: RADIOLOGY

## 2025-05-29 PROCEDURE — 99999 PR PBB SHADOW E&M-EST. PATIENT-LVL III: CPT | Mod: PBBFAC,,, | Performed by: ORTHOPAEDIC SURGERY

## 2025-06-10 NOTE — PROGRESS NOTES
Chief Complaint   Patient presents with    Left Hip - Post-op Evaluation       HPI:  85 y.o. male returns to clinic today status post left hip hemiarthroplasty 2 weeks ago. Pain is minimal. Patient is compliant most of the time with restrictions.     left Hip Exam   Tenderness   The patient is experiencing no tenderness.   Incision healed    Range of Motion   The patient has normal right hip ROM.    Muscle Strength   Flexion: 4/5     Other   Erythema: absent  Sensation: normal  Pulse: present    X-rays were performed today, personally reviewed by me and findings discussed with the patient.  2 views of the left hip show implants intact with no evidence of loosening    Left displaced femoral neck fracture        Staples out. Continue PT. RTC 6 weeks

## 2025-07-02 DIAGNOSIS — M25.559 HIP PAIN, UNSPECIFIED LATERALITY: Primary | ICD-10-CM

## 2025-07-10 ENCOUNTER — HOSPITAL ENCOUNTER (OUTPATIENT)
Dept: RADIOLOGY | Facility: HOSPITAL | Age: 85
Discharge: HOME | End: 2025-07-10
Attending: NURSE PRACTITIONER
Payer: MEDICARE

## 2025-07-10 ENCOUNTER — OFFICE VISIT (OUTPATIENT)
Dept: ORTHOPEDICS | Facility: CLINIC | Age: 85
End: 2025-07-10
Payer: MEDICARE

## 2025-07-10 VITALS
BODY MASS INDEX: 22.91 KG/M2 | HEART RATE: 105 BPM | DIASTOLIC BLOOD PRESSURE: 82 MMHG | HEIGHT: 67 IN | WEIGHT: 145.94 LBS | SYSTOLIC BLOOD PRESSURE: 164 MMHG

## 2025-07-10 DIAGNOSIS — Z96.649 S/P HIP HEMIARTHROPLASTY: Primary | ICD-10-CM

## 2025-07-10 DIAGNOSIS — M25.559 HIP PAIN, UNSPECIFIED LATERALITY: ICD-10-CM

## 2025-07-10 PROCEDURE — 73502 X-RAY EXAM HIP UNI 2-3 VIEWS: CPT | Mod: 26,LT,, | Performed by: RADIOLOGY

## 2025-07-10 PROCEDURE — 99213 OFFICE O/P EST LOW 20 MIN: CPT | Mod: PBBFAC,25,PO | Performed by: NURSE PRACTITIONER

## 2025-07-10 PROCEDURE — 99999 PR PBB SHADOW E&M-EST. PATIENT-LVL III: CPT | Mod: PBBFAC,,, | Performed by: NURSE PRACTITIONER

## 2025-07-10 PROCEDURE — 99024 POSTOP FOLLOW-UP VISIT: CPT | Mod: POP,,, | Performed by: NURSE PRACTITIONER

## 2025-07-10 PROCEDURE — 73502 X-RAY EXAM HIP UNI 2-3 VIEWS: CPT | Mod: TC,PO,LT

## 2025-07-10 NOTE — PROGRESS NOTES
Chief Complaint   Patient presents with    Left Hip - Pain       HPI:  85 y.o. returns to clinic today status post left mari hip arthroplasty 8 weeks ago. Pain is dull and achy. Patient is compliant most of the time with restrictions.     Left hip   Incision healed; no redness; no drainage.  2+pitting edema BLE.     X-rays were performed today, personally reviewed by me and findings discussed with the patient.  3 views of the left hip show implants intact.            S/P hip hemiarthroplasty    Continue home health physical therapy  Rtc in 2 months.

## 2025-09-05 DIAGNOSIS — M25.559 HIP PAIN, UNSPECIFIED LATERALITY: Primary | ICD-10-CM
